# Patient Record
Sex: FEMALE | Race: WHITE | NOT HISPANIC OR LATINO
[De-identification: names, ages, dates, MRNs, and addresses within clinical notes are randomized per-mention and may not be internally consistent; named-entity substitution may affect disease eponyms.]

---

## 2017-07-17 ENCOUNTER — APPOINTMENT (OUTPATIENT)
Dept: SURGICAL ONCOLOGY | Facility: CLINIC | Age: 77
End: 2017-07-17
Payer: MEDICARE

## 2017-07-17 VITALS
HEART RATE: 82 BPM | HEIGHT: 65 IN | RESPIRATION RATE: 15 BRPM | DIASTOLIC BLOOD PRESSURE: 84 MMHG | WEIGHT: 183 LBS | SYSTOLIC BLOOD PRESSURE: 127 MMHG | BODY MASS INDEX: 30.49 KG/M2

## 2017-07-17 PROCEDURE — 99203 OFFICE O/P NEW LOW 30 MIN: CPT

## 2017-07-26 ENCOUNTER — OUTPATIENT (OUTPATIENT)
Dept: OUTPATIENT SERVICES | Facility: HOSPITAL | Age: 77
LOS: 1 days | End: 2017-07-26
Payer: MEDICARE

## 2017-07-26 DIAGNOSIS — L98.9 DISORDER OF THE SKIN AND SUBCUTANEOUS TISSUE, UNSPECIFIED: ICD-10-CM

## 2017-07-27 ENCOUNTER — RESULT REVIEW (OUTPATIENT)
Age: 77
End: 2017-07-27

## 2017-07-27 PROCEDURE — 88321 CONSLTJ&REPRT SLD PREP ELSWR: CPT

## 2017-07-28 LAB — SURGICAL PATHOLOGY STUDY: SIGNIFICANT CHANGE UP

## 2017-08-09 ENCOUNTER — APPOINTMENT (OUTPATIENT)
Dept: THORACIC SURGERY | Facility: CLINIC | Age: 77
End: 2017-08-09
Payer: MEDICARE

## 2017-08-09 ENCOUNTER — OUTPATIENT (OUTPATIENT)
Dept: OUTPATIENT SERVICES | Facility: HOSPITAL | Age: 77
LOS: 1 days | End: 2017-08-09
Payer: MEDICARE

## 2017-08-09 VITALS
SYSTOLIC BLOOD PRESSURE: 132 MMHG | TEMPERATURE: 98 F | HEIGHT: 64 IN | OXYGEN SATURATION: 96 % | RESPIRATION RATE: 16 BRPM | WEIGHT: 182.98 LBS | DIASTOLIC BLOOD PRESSURE: 77 MMHG | HEART RATE: 74 BPM

## 2017-08-09 VITALS
DIASTOLIC BLOOD PRESSURE: 76 MMHG | HEART RATE: 80 BPM | HEIGHT: 65 IN | RESPIRATION RATE: 16 BRPM | SYSTOLIC BLOOD PRESSURE: 134 MMHG | WEIGHT: 183 LBS | OXYGEN SATURATION: 94 % | BODY MASS INDEX: 30.49 KG/M2

## 2017-08-09 DIAGNOSIS — Z01.818 ENCOUNTER FOR OTHER PREPROCEDURAL EXAMINATION: ICD-10-CM

## 2017-08-09 DIAGNOSIS — D03.62 MELANOMA IN SITU OF LEFT UPPER LIMB, INCLUDING SHOULDER: ICD-10-CM

## 2017-08-09 DIAGNOSIS — Z98.890 OTHER SPECIFIED POSTPROCEDURAL STATES: Chronic | ICD-10-CM

## 2017-08-09 PROCEDURE — 99213 OFFICE O/P EST LOW 20 MIN: CPT

## 2017-08-09 PROCEDURE — G0463: CPT

## 2017-08-09 RX ORDER — LIDOCAINE HCL 20 MG/ML
0.2 VIAL (ML) INJECTION ONCE
Qty: 0 | Refills: 0 | Status: DISCONTINUED | OUTPATIENT
Start: 2017-08-18 | End: 2017-09-02

## 2017-08-09 RX ORDER — SODIUM CHLORIDE 9 MG/ML
3 INJECTION INTRAMUSCULAR; INTRAVENOUS; SUBCUTANEOUS EVERY 8 HOURS
Qty: 0 | Refills: 0 | Status: DISCONTINUED | OUTPATIENT
Start: 2017-08-18 | End: 2017-09-02

## 2017-08-09 NOTE — H&P PST ADULT - NSANTHOSAYNRD_GEN_A_CORE
No. ELISA screening performed.  STOP BANG Legend: 0-2 = LOW Risk; 3-4 = INTERMEDIATE Risk; 5-8 = HIGH Risk

## 2017-08-09 NOTE — H&P PST ADULT - HISTORY OF PRESENT ILLNESS
77 year old female with h/o 77 year old female with h/o HTN, melanoma- f/u dermatology evaluation revealed left shoulder lesion- + biopsy for wide excision melanoma left shoulder on 08/18/2017

## 2017-08-09 NOTE — H&P PST ADULT - ATTENDING COMMENTS
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    Technique and indication Were reviewed in my office in on the day of operation    All questions answered.    Consent on chart

## 2017-08-09 NOTE — H&P PST ADULT - NEGATIVE CARDIOVASCULAR SYMPTOMS
no palpitations/no dyspnea on exertion/no orthopnea/no chest pain/no peripheral edema/no claudication/no paroxysmal nocturnal dyspnea

## 2017-08-09 NOTE — H&P PST ADULT - PMH
C. difficile diarrhea    Diverticulosis of large intestine with hemorrhage  04/2017  Ex-smoker    HTN (hypertension)    Hx of melanoma of skin- leg  >7 years ago    Hyperlipidemia    Lung nodules- H/O    Melanoma in situ of left shoulder    SCC (squamous cell carcinoma)  anterior upper chest wall

## 2017-08-09 NOTE — H&P PST ADULT - PSH
H/O colonoscopy  06/08/2017  H/O melanoma excision  ?  H/O: hysterectomy- 1982    Hx of tonsillectomy  in childhood  Squamous cell skin cancer  excision and biopsy > 1 year  Status post lung surgery > 7 years ago  lung ca.

## 2017-08-18 ENCOUNTER — OUTPATIENT (OUTPATIENT)
Dept: OUTPATIENT SERVICES | Facility: HOSPITAL | Age: 77
LOS: 1 days | End: 2017-08-18
Payer: MEDICARE

## 2017-08-18 ENCOUNTER — RESULT REVIEW (OUTPATIENT)
Age: 77
End: 2017-08-18

## 2017-08-18 ENCOUNTER — APPOINTMENT (OUTPATIENT)
Dept: SURGICAL ONCOLOGY | Facility: HOSPITAL | Age: 77
End: 2017-08-18

## 2017-08-18 VITALS
RESPIRATION RATE: 18 BRPM | DIASTOLIC BLOOD PRESSURE: 60 MMHG | SYSTOLIC BLOOD PRESSURE: 109 MMHG | TEMPERATURE: 98 F | HEART RATE: 69 BPM | OXYGEN SATURATION: 97 %

## 2017-08-18 VITALS
HEIGHT: 64 IN | SYSTOLIC BLOOD PRESSURE: 124 MMHG | DIASTOLIC BLOOD PRESSURE: 81 MMHG | HEART RATE: 80 BPM | OXYGEN SATURATION: 95 % | TEMPERATURE: 98 F | WEIGHT: 182.98 LBS | RESPIRATION RATE: 16 BRPM

## 2017-08-18 DIAGNOSIS — Z01.818 ENCOUNTER FOR OTHER PREPROCEDURAL EXAMINATION: ICD-10-CM

## 2017-08-18 DIAGNOSIS — Z98.890 OTHER SPECIFIED POSTPROCEDURAL STATES: Chronic | ICD-10-CM

## 2017-08-18 DIAGNOSIS — D03.62 MELANOMA IN SITU OF LEFT UPPER LIMB, INCLUDING SHOULDER: ICD-10-CM

## 2017-08-18 PROCEDURE — 14001 TIS TRNFR TRUNK 10.1-30SQCM: CPT

## 2017-08-18 PROCEDURE — 88305 TISSUE EXAM BY PATHOLOGIST: CPT

## 2017-08-18 PROCEDURE — 11606 EXC TR-EXT MAL+MARG >4 CM: CPT | Mod: 59

## 2017-08-18 PROCEDURE — 88305 TISSUE EXAM BY PATHOLOGIST: CPT | Mod: 26

## 2017-08-18 RX ORDER — ACETAMINOPHEN 500 MG
975 TABLET ORAL ONCE
Qty: 0 | Refills: 0 | Status: COMPLETED | OUTPATIENT
Start: 2017-08-18 | End: 2017-08-18

## 2017-08-18 RX ORDER — CELECOXIB 200 MG/1
200 CAPSULE ORAL ONCE
Qty: 0 | Refills: 0 | Status: COMPLETED | OUTPATIENT
Start: 2017-08-18 | End: 2017-08-18

## 2017-08-18 RX ORDER — ONDANSETRON 8 MG/1
4 TABLET, FILM COATED ORAL ONCE
Qty: 0 | Refills: 0 | Status: DISCONTINUED | OUTPATIENT
Start: 2017-08-18 | End: 2017-09-02

## 2017-08-18 RX ORDER — SODIUM CHLORIDE 9 MG/ML
1000 INJECTION INTRAMUSCULAR; INTRAVENOUS; SUBCUTANEOUS
Qty: 0 | Refills: 0 | Status: DISCONTINUED | OUTPATIENT
Start: 2017-08-18 | End: 2017-09-02

## 2017-08-18 NOTE — PRE-ANESTHESIA EVALUATION ADULT - NSANTHPMHFT_GEN_ALL_CORE
CDIff diarrhea 3 years ago  Stopped smoking 2009 with lung cancer, s/p resection. seen lung doctor 2 weeks ago, everything ok  GERD only with food tomatoes, takes zantac PRN, last time took it was last night, feels good today

## 2017-08-18 NOTE — ASU DISCHARGE PLAN (ADULT/PEDIATRIC). - NOTIFY
Numbness, tingling/Increased Irritability or Sluggishness/Persistent Nausea and Vomiting/Bleeding that does not stop/Excessive Diarrhea/Fever greater than 101/Pain not relieved by Medications/Unable to Urinate/Numbness, color, or temperature change to extremity/Inability to Tolerate Liquids or Foods/Swelling that continues

## 2017-08-21 ENCOUNTER — TRANSCRIPTION ENCOUNTER (OUTPATIENT)
Age: 77
End: 2017-08-21

## 2017-08-22 LAB — SURGICAL PATHOLOGY STUDY: SIGNIFICANT CHANGE UP

## 2017-09-11 ENCOUNTER — APPOINTMENT (OUTPATIENT)
Dept: SURGICAL ONCOLOGY | Facility: CLINIC | Age: 77
End: 2017-09-11
Payer: MEDICARE

## 2017-09-11 VITALS
HEART RATE: 89 BPM | DIASTOLIC BLOOD PRESSURE: 82 MMHG | SYSTOLIC BLOOD PRESSURE: 137 MMHG | BODY MASS INDEX: 30.73 KG/M2 | TEMPERATURE: 98.3 F | HEIGHT: 64 IN | WEIGHT: 180 LBS | RESPIRATION RATE: 16 BRPM | OXYGEN SATURATION: 95 %

## 2017-09-11 DIAGNOSIS — Z87.59 PERSONAL HISTORY OF OTHER COMPLICATIONS OF PREGNANCY, CHILDBIRTH AND THE PUERPERIUM: ICD-10-CM

## 2017-09-11 DIAGNOSIS — Z87.19 PERSONAL HISTORY OF OTHER DISEASES OF THE DIGESTIVE SYSTEM: ICD-10-CM

## 2017-09-11 DIAGNOSIS — D03.62 MELANOMA IN SITU OF LEFT UPPER LIMB, INCLUDING SHOULDER: ICD-10-CM

## 2017-09-11 PROCEDURE — 99024 POSTOP FOLLOW-UP VISIT: CPT

## 2018-07-13 ENCOUNTER — RX RENEWAL (OUTPATIENT)
Age: 78
End: 2018-07-13

## 2018-09-22 ENCOUNTER — RECORD ABSTRACTING (OUTPATIENT)
Age: 78
End: 2018-09-22

## 2018-09-22 DIAGNOSIS — I35.9 NONRHEUMATIC AORTIC VALVE DISORDER, UNSPECIFIED: ICD-10-CM

## 2018-09-22 DIAGNOSIS — E66.3 OVERWEIGHT: ICD-10-CM

## 2018-09-22 DIAGNOSIS — K57.32 DIVERTICULITIS OF LARGE INTESTINE W/OUT PERFORATION OR ABSCESS W/OUT BLEEDING: ICD-10-CM

## 2018-09-22 DIAGNOSIS — Z87.828 PERSONAL HISTORY OF OTHER (HEALED) PHYSICAL INJURY AND TRAUMA: ICD-10-CM

## 2018-09-22 DIAGNOSIS — I73.9 PERIPHERAL VASCULAR DISEASE, UNSPECIFIED: ICD-10-CM

## 2018-09-22 RX ORDER — ASPIRIN 81 MG
81 TABLET, DELAYED RELEASE (ENTERIC COATED) ORAL
Refills: 0 | Status: ACTIVE | COMMUNITY

## 2018-11-28 ENCOUNTER — MEDICATION RENEWAL (OUTPATIENT)
Age: 78
End: 2018-11-28

## 2018-11-29 ENCOUNTER — MEDICATION RENEWAL (OUTPATIENT)
Age: 78
End: 2018-11-29

## 2018-12-07 ENCOUNTER — APPOINTMENT (OUTPATIENT)
Dept: PULMONOLOGY | Facility: CLINIC | Age: 78
End: 2018-12-07
Payer: MEDICARE

## 2018-12-07 VITALS
OXYGEN SATURATION: 92 % | SYSTOLIC BLOOD PRESSURE: 115 MMHG | DIASTOLIC BLOOD PRESSURE: 74 MMHG | HEART RATE: 86 BPM | RESPIRATION RATE: 16 BRPM

## 2018-12-07 PROBLEM — K57.31 DIVERTICULOSIS OF LARGE INTESTINE WITHOUT PERFORATION OR ABSCESS WITH BLEEDING: Chronic | Status: ACTIVE | Noted: 2017-08-09

## 2018-12-07 PROBLEM — D03.62 MELANOMA IN SITU OF LEFT UPPER LIMB, INCLUDING SHOULDER: Chronic | Status: ACTIVE | Noted: 2017-08-09

## 2018-12-07 PROCEDURE — 99214 OFFICE O/P EST MOD 30 MIN: CPT

## 2018-12-07 RX ORDER — LISINOPRIL 20 MG/1
20 TABLET ORAL
Refills: 0 | Status: ACTIVE | COMMUNITY

## 2018-12-07 RX ORDER — AMLODIPINE BESYLATE 5 MG/1
5 TABLET ORAL
Refills: 0 | Status: DISCONTINUED | COMMUNITY
End: 2018-12-07

## 2018-12-07 RX ORDER — ATORVASTATIN CALCIUM 40 MG/1
40 TABLET, FILM COATED ORAL
Refills: 0 | Status: DISCONTINUED | COMMUNITY
End: 2018-12-07

## 2018-12-07 RX ORDER — LISINOPRIL 10 MG/1
10 TABLET ORAL
Refills: 0 | Status: DISCONTINUED | COMMUNITY
End: 2018-12-07

## 2019-02-20 ENCOUNTER — APPOINTMENT (OUTPATIENT)
Dept: THORACIC SURGERY | Facility: CLINIC | Age: 79
End: 2019-02-20
Payer: MEDICARE

## 2019-02-20 VITALS
DIASTOLIC BLOOD PRESSURE: 84 MMHG | TEMPERATURE: 97.9 F | BODY MASS INDEX: 33.97 KG/M2 | HEIGHT: 64 IN | OXYGEN SATURATION: 95 % | WEIGHT: 199 LBS | SYSTOLIC BLOOD PRESSURE: 125 MMHG | RESPIRATION RATE: 16 BRPM | HEART RATE: 80 BPM

## 2019-02-20 DIAGNOSIS — Z86.19 PERSONAL HISTORY OF OTHER INFECTIOUS AND PARASITIC DISEASES: ICD-10-CM

## 2019-02-20 PROCEDURE — 99213 OFFICE O/P EST LOW 20 MIN: CPT

## 2019-02-21 PROBLEM — Z86.19 HISTORY OF HERPES ZOSTER: Status: RESOLVED | Noted: 2019-02-21 | Resolved: 2019-02-21

## 2019-02-21 RX ORDER — ATORVASTATIN CALCIUM 40 MG/1
40 TABLET, FILM COATED ORAL
Refills: 0 | Status: ACTIVE | COMMUNITY

## 2019-02-21 RX ORDER — ATORVASTATIN CALCIUM 80 MG/1
80 TABLET, FILM COATED ORAL
Refills: 0 | Status: DISCONTINUED | COMMUNITY
End: 2019-02-21

## 2019-02-21 NOTE — PHYSICAL EXAM
[Sclera] : the sclera and conjunctiva were normal [Extraocular Movements] : extraocular movements were intact [Neck Appearance] : the appearance of the neck was normal [Neck Cervical Mass (___cm)] : no neck mass was observed [Jugular Venous Distention Increased] : there was no jugular-venous distention [] : no respiratory distress [Respiration, Rhythm And Depth] : normal respiratory rhythm and effort [Heart Rate And Rhythm] : heart rate was normal and rhythm regular [Chest Visual Inspection Thoracic Asymmetry] : no chest asymmetry [Diminished Respiratory Excursion] : normal chest expansion [2+] : left 2+ [Bowel Sounds] : normal bowel sounds [Abdomen Soft] : soft [Abdomen Tenderness] : non-tender [Cervical Lymph Nodes Enlarged Posterior Bilaterally] : posterior cervical [Cervical Lymph Nodes Enlarged Anterior Bilaterally] : anterior cervical [Supraclavicular Lymph Nodes Enlarged Bilaterally] : supraclavicular [Abnormal Walk] : normal gait [Skin Color & Pigmentation] : normal skin color and pigmentation [No Focal Deficits] : no focal deficits [Oriented To Time, Place, And Person] : oriented to person, place, and time [Impaired Insight] : insight and judgment were intact [Affect] : the affect was normal [Mood] : the mood was normal

## 2019-02-22 NOTE — CONSULT LETTER
[Dear  ___] : Dear  [unfilled], [Courtesy Letter:] : I had the pleasure of seeing your patient, [unfilled], in my office today. [Please see my note below.] : Please see my note below. [Sincerely,] : Sincerely, [FreeTextEntry2] :  Dr. Carlos Hannah \par  [FreeTextEntry3] : \par Wilmar Rios MD, FACS \par , Division of Thoracic Surgery \par [facility] \par Chief, Thoracic Surgery \par [facility] \par Department of Cardiovascular & Thoracic Surgery \par  \par [employer] [facility] [employer] [facility] \par

## 2019-02-22 NOTE — HISTORY OF PRESENT ILLNESS
[FreeTextEntry1] : 78 year old female with history of a right VATS, right lower lobe wedge resection on 2/2/09 for a T1NxMx adenocarcinoma with JUAN M features. She also has hx of Moh's surgery on right leg for a squamous cell and resection of melanoma on left shoulder with Dr. Dias. She presents today for 18 month follow up. \par \par CT chest scan on 2/15/19 reveals scattered nodular opacities that are stable. There is a 30 x 23 mm nodule of left adrenal that has increased in both size and density since previous exam. \par \par CT scan of the chest done 8/4/17 reports stable post surgical changes in right lower lobe. Patchy low density nodularity in the left lower lobe is also unchanged, and no new pulmonary lesions. \par \par The patient reports shortness of breath with exertion ex: climbing stairs. She is followed by Dr. Hannah. The patient denies cough, chest pain, fever, chills, dysphagia or hemoptysis. \par

## 2019-02-22 NOTE — ASSESSMENT
[FreeTextEntry1] : 78 year old female with history of a right VATS, right lower lobe wedge resection on 2/2/09 for a T1NxMx adenocarcinoma with JUAN M features. CT chest scan on 2/15/19 reveals scattered nodular opacities which are stable. There is a 30 x 23 mm nodule of left adrenal that has increased in both size and density since previous exam. I have reviewed the patient's images from previous years. The adrenal nodule appears stable and benign. I have recommended that the patient follow up in 18 months with a CT scan of the chest. \par \par Written by Nadja Goncalves NP, acting as a scribe for Wilmar Valentine MD.\par The documentation recorded by the scribe accurately reflects the service I personally performed and the decisions made by me. WILMAR VALENTINE MD\par

## 2019-02-22 NOTE — REVIEW OF SYSTEMS
[As Noted in HPI] : as noted in HPI [Negative] : Heme/Lymph [FreeTextEntry9] : Left hip pain. Plan for injections with pain management.

## 2019-03-05 ENCOUNTER — OUTPATIENT (OUTPATIENT)
Dept: OUTPATIENT SERVICES | Facility: HOSPITAL | Age: 79
LOS: 1 days | End: 2019-03-05
Payer: MEDICARE

## 2019-03-05 DIAGNOSIS — Z98.890 OTHER SPECIFIED POSTPROCEDURAL STATES: Chronic | ICD-10-CM

## 2019-03-05 DIAGNOSIS — M47.817 SPONDYLOSIS WITHOUT MYELOPATHY OR RADICULOPATHY, LUMBOSACRAL REGION: ICD-10-CM

## 2019-03-05 PROCEDURE — 64494 INJ PARAVERT F JNT L/S 2 LEV: CPT | Mod: LT

## 2019-03-05 PROCEDURE — 77003 FLUOROGUIDE FOR SPINE INJECT: CPT

## 2019-03-05 PROCEDURE — 64495 INJ PARAVERT F JNT L/S 3 LEV: CPT | Mod: LT

## 2019-03-05 PROCEDURE — 64493 INJ PARAVERT F JNT L/S 1 LEV: CPT | Mod: LT

## 2019-03-19 ENCOUNTER — OUTPATIENT (OUTPATIENT)
Dept: OUTPATIENT SERVICES | Facility: HOSPITAL | Age: 79
LOS: 1 days | End: 2019-03-19
Payer: MEDICARE

## 2019-03-19 DIAGNOSIS — Z98.890 OTHER SPECIFIED POSTPROCEDURAL STATES: Chronic | ICD-10-CM

## 2019-03-19 DIAGNOSIS — M47.817 SPONDYLOSIS WITHOUT MYELOPATHY OR RADICULOPATHY, LUMBOSACRAL REGION: ICD-10-CM

## 2019-03-19 PROCEDURE — 64494 INJ PARAVERT F JNT L/S 2 LEV: CPT | Mod: LT

## 2019-03-19 PROCEDURE — 64495 INJ PARAVERT F JNT L/S 3 LEV: CPT | Mod: LT

## 2019-03-19 PROCEDURE — 64493 INJ PARAVERT F JNT L/S 1 LEV: CPT | Mod: LT

## 2019-03-19 PROCEDURE — 77003 FLUOROGUIDE FOR SPINE INJECT: CPT

## 2019-06-07 ENCOUNTER — APPOINTMENT (OUTPATIENT)
Dept: PULMONOLOGY | Facility: CLINIC | Age: 79
End: 2019-06-07
Payer: MEDICARE

## 2019-06-07 DIAGNOSIS — C43.9 MALIGNANT MELANOMA OF SKIN, UNSPECIFIED: ICD-10-CM

## 2019-06-07 PROCEDURE — 99214 OFFICE O/P EST MOD 30 MIN: CPT

## 2019-06-09 NOTE — DISCUSSION/SUMMARY
[FreeTextEntry1] : Patient with multiple medical problems. All medical problems as well his medications were reviewed. Medications were renewed.\par \par There is no significant need for change in present medication or therapy.\par \par overweight

## 2019-06-09 NOTE — HISTORY OF PRESENT ILLNESS
[FreeTextEntry1] : Doing OK\par \par Problem with hip and back on left. \par Seeing Dr. Buitrago\par \par Colonoscopy 2017\par Mammo Y\par GYN Y\par Optho Y\par Derm Y\par BMD 6/18\par \par \par Has gained wt. \par

## 2019-06-09 NOTE — PHYSICAL EXAM
[General Appearance - Well Developed] : well developed [Jugular Venous Distention Increased] : there was no jugular-venous distention [Normal Oropharynx] : normal oropharynx [Abdomen Soft] : soft [Heart Sounds] : normal S1 and S2 [Nail Clubbing] : no clubbing of the fingernails [Cyanosis, Localized] : no localized cyanosis [Skin Color & Pigmentation] : normal skin color and pigmentation [] : no ischemic changes [Petechial Hemorrhages (___cm)] : no petechial hemorrhages [No Venous Stasis] : no venous stasis [Skin Lesions] : no skin lesions [No Skin Ulcers] : no skin ulcer [Deep Tendon Reflexes (DTR)] : deep tendon reflexes were 2+ and symmetric [No Xanthoma] : no  xanthoma was observed [Oriented To Time, Place, And Person] : oriented to person, place, and time [No Focal Deficits] : no focal deficits [Sensation] : the sensory exam was normal to light touch and pinprick [Impaired Insight] : insight and judgment were intact [Affect] : the affect was normal [FreeTextEntry1] : Overweight

## 2019-06-17 ENCOUNTER — OUTPATIENT (OUTPATIENT)
Dept: OUTPATIENT SERVICES | Facility: HOSPITAL | Age: 79
LOS: 1 days | End: 2019-06-17
Payer: MEDICARE

## 2019-06-17 DIAGNOSIS — Z98.890 OTHER SPECIFIED POSTPROCEDURAL STATES: Chronic | ICD-10-CM

## 2019-06-17 DIAGNOSIS — M47.817 SPONDYLOSIS WITHOUT MYELOPATHY OR RADICULOPATHY, LUMBOSACRAL REGION: ICD-10-CM

## 2019-06-17 PROCEDURE — 64635 DESTROY LUMB/SAC FACET JNT: CPT | Mod: LT

## 2019-06-17 PROCEDURE — 77003 FLUOROGUIDE FOR SPINE INJECT: CPT

## 2019-06-17 PROCEDURE — 64636 DESTROY L/S FACET JNT ADDL: CPT | Mod: LT

## 2019-11-25 ENCOUNTER — APPOINTMENT (OUTPATIENT)
Dept: ORTHOPEDIC SURGERY | Facility: CLINIC | Age: 79
End: 2019-11-25
Payer: MEDICARE

## 2019-11-25 VITALS
HEART RATE: 83 BPM | SYSTOLIC BLOOD PRESSURE: 162 MMHG | BODY MASS INDEX: 31.58 KG/M2 | HEIGHT: 64 IN | WEIGHT: 185 LBS | DIASTOLIC BLOOD PRESSURE: 91 MMHG

## 2019-11-25 DIAGNOSIS — Z60.2 PROBLEMS RELATED TO LIVING ALONE: ICD-10-CM

## 2019-11-25 PROCEDURE — 72110 X-RAY EXAM L-2 SPINE 4/>VWS: CPT

## 2019-11-25 PROCEDURE — 99204 OFFICE O/P NEW MOD 45 MIN: CPT

## 2019-11-25 RX ORDER — AMLODIPINE BESYLATE 2.5 MG/1
2.5 TABLET ORAL DAILY
Qty: 90 | Refills: 3 | Status: DISCONTINUED | COMMUNITY
Start: 2018-12-07 | End: 2019-11-25

## 2019-11-25 SDOH — SOCIAL STABILITY - SOCIAL INSECURITY: PROBLEMS RELATED TO LIVING ALONE: Z60.2

## 2019-11-26 ENCOUNTER — CLINICAL ADVICE (OUTPATIENT)
Age: 79
End: 2019-11-26

## 2019-11-26 ENCOUNTER — OUTPATIENT (OUTPATIENT)
Dept: OUTPATIENT SERVICES | Facility: HOSPITAL | Age: 79
LOS: 1 days | End: 2019-11-26
Payer: MEDICARE

## 2019-11-26 DIAGNOSIS — Z98.890 OTHER SPECIFIED POSTPROCEDURAL STATES: Chronic | ICD-10-CM

## 2019-11-26 DIAGNOSIS — M54.16 RADICULOPATHY, LUMBAR REGION: ICD-10-CM

## 2019-11-26 PROCEDURE — 77003 FLUOROGUIDE FOR SPINE INJECT: CPT

## 2019-11-26 PROCEDURE — 64484 NJX AA&/STRD TFRM EPI L/S EA: CPT | Mod: LT

## 2019-11-26 PROCEDURE — 64483 NJX AA&/STRD TFRM EPI L/S 1: CPT | Mod: LT

## 2019-12-01 ENCOUNTER — FORM ENCOUNTER (OUTPATIENT)
Age: 79
End: 2019-12-01

## 2019-12-02 ENCOUNTER — APPOINTMENT (OUTPATIENT)
Dept: CT IMAGING | Facility: CLINIC | Age: 79
End: 2019-12-02
Payer: MEDICARE

## 2019-12-02 ENCOUNTER — OUTPATIENT (OUTPATIENT)
Dept: OUTPATIENT SERVICES | Facility: HOSPITAL | Age: 79
LOS: 1 days | End: 2019-12-02
Payer: MEDICARE

## 2019-12-02 DIAGNOSIS — Z98.890 OTHER SPECIFIED POSTPROCEDURAL STATES: Chronic | ICD-10-CM

## 2019-12-02 DIAGNOSIS — M48.061 SPINAL STENOSIS, LUMBAR REGION WITHOUT NEUROGENIC CLAUDICATION: ICD-10-CM

## 2019-12-02 DIAGNOSIS — M43.16 SPONDYLOLISTHESIS, LUMBAR REGION: ICD-10-CM

## 2019-12-02 DIAGNOSIS — M54.16 RADICULOPATHY, LUMBAR REGION: ICD-10-CM

## 2019-12-02 PROCEDURE — 72131 CT LUMBAR SPINE W/O DYE: CPT | Mod: 26

## 2019-12-02 PROCEDURE — 72131 CT LUMBAR SPINE W/O DYE: CPT

## 2019-12-02 PROCEDURE — 76376 3D RENDER W/INTRP POSTPROCES: CPT | Mod: 26

## 2019-12-02 PROCEDURE — 76376 3D RENDER W/INTRP POSTPROCES: CPT

## 2019-12-05 ENCOUNTER — FORM ENCOUNTER (OUTPATIENT)
Age: 79
End: 2019-12-05

## 2019-12-06 ENCOUNTER — LABORATORY RESULT (OUTPATIENT)
Age: 79
End: 2019-12-06

## 2019-12-06 ENCOUNTER — APPOINTMENT (OUTPATIENT)
Dept: PULMONOLOGY | Facility: CLINIC | Age: 79
End: 2019-12-06
Payer: MEDICARE

## 2019-12-06 ENCOUNTER — OUTPATIENT (OUTPATIENT)
Dept: OUTPATIENT SERVICES | Facility: HOSPITAL | Age: 79
LOS: 1 days | End: 2019-12-06
Payer: MEDICARE

## 2019-12-06 VITALS
OXYGEN SATURATION: 95 % | SYSTOLIC BLOOD PRESSURE: 132 MMHG | HEART RATE: 84 BPM | RESPIRATION RATE: 16 BRPM | DIASTOLIC BLOOD PRESSURE: 70 MMHG | HEIGHT: 64 IN | BODY MASS INDEX: 31.58 KG/M2 | WEIGHT: 185 LBS

## 2019-12-06 VITALS
HEIGHT: 64 IN | WEIGHT: 190.92 LBS | DIASTOLIC BLOOD PRESSURE: 83 MMHG | TEMPERATURE: 98 F | OXYGEN SATURATION: 95 % | HEART RATE: 103 BPM | SYSTOLIC BLOOD PRESSURE: 128 MMHG

## 2019-12-06 DIAGNOSIS — M48.061 SPINAL STENOSIS, LUMBAR REGION WITHOUT NEUROGENIC CLAUDICATION: ICD-10-CM

## 2019-12-06 DIAGNOSIS — Z01.818 ENCOUNTER FOR OTHER PREPROCEDURAL EXAMINATION: ICD-10-CM

## 2019-12-06 DIAGNOSIS — M43.10 SPONDYLOLISTHESIS, SITE UNSPECIFIED: ICD-10-CM

## 2019-12-06 DIAGNOSIS — M54.16 RADICULOPATHY, LUMBAR REGION: ICD-10-CM

## 2019-12-06 DIAGNOSIS — Z98.890 OTHER SPECIFIED POSTPROCEDURAL STATES: Chronic | ICD-10-CM

## 2019-12-06 DIAGNOSIS — M43.16 SPONDYLOLISTHESIS, LUMBAR REGION: ICD-10-CM

## 2019-12-06 DIAGNOSIS — E78.5 HYPERLIPIDEMIA, UNSPECIFIED: ICD-10-CM

## 2019-12-06 DIAGNOSIS — I10 ESSENTIAL (PRIMARY) HYPERTENSION: ICD-10-CM

## 2019-12-06 DIAGNOSIS — R76.0 RAISED ANTIBODY TITER: ICD-10-CM

## 2019-12-06 LAB
ANION GAP SERPL CALC-SCNC: 12 MMOL/L — SIGNIFICANT CHANGE UP (ref 5–17)
BLD GP AB SCN SERPL QL: NEGATIVE — SIGNIFICANT CHANGE UP
BUN SERPL-MCNC: 22 MG/DL — SIGNIFICANT CHANGE UP (ref 7–23)
CALCIUM SERPL-MCNC: 10 MG/DL — SIGNIFICANT CHANGE UP (ref 8.4–10.5)
CHLORIDE SERPL-SCNC: 107 MMOL/L — SIGNIFICANT CHANGE UP (ref 96–108)
CO2 SERPL-SCNC: 24 MMOL/L — SIGNIFICANT CHANGE UP (ref 22–31)
CREAT SERPL-MCNC: 0.66 MG/DL — SIGNIFICANT CHANGE UP (ref 0.5–1.3)
GLUCOSE SERPL-MCNC: 105 MG/DL — HIGH (ref 70–99)
HCT VFR BLD CALC: 41.3 % — SIGNIFICANT CHANGE UP (ref 34.5–45)
HGB BLD-MCNC: 13 G/DL — SIGNIFICANT CHANGE UP (ref 11.5–15.5)
MCHC RBC-ENTMCNC: 30.9 PG — SIGNIFICANT CHANGE UP (ref 27–34)
MCHC RBC-ENTMCNC: 31.5 GM/DL — LOW (ref 32–36)
MCV RBC AUTO: 98.1 FL — SIGNIFICANT CHANGE UP (ref 80–100)
PLATELET # BLD AUTO: 230 K/UL — SIGNIFICANT CHANGE UP (ref 150–400)
POCT - HEMOGLOBIN (HGB), QUANTITATIVE, TRANSCUTANEOUS: 13.5
POTASSIUM SERPL-MCNC: 3.7 MMOL/L — SIGNIFICANT CHANGE UP (ref 3.5–5.3)
POTASSIUM SERPL-SCNC: 3.7 MMOL/L — SIGNIFICANT CHANGE UP (ref 3.5–5.3)
RBC # BLD: 4.21 M/UL — SIGNIFICANT CHANGE UP (ref 3.8–5.2)
RBC # FLD: 13.7 % — SIGNIFICANT CHANGE UP (ref 10.3–14.5)
RH IG SCN BLD-IMP: POSITIVE — SIGNIFICANT CHANGE UP
SODIUM SERPL-SCNC: 143 MMOL/L — SIGNIFICANT CHANGE UP (ref 135–145)
WBC # BLD: 5.81 K/UL — SIGNIFICANT CHANGE UP (ref 3.8–10.5)
WBC # FLD AUTO: 5.81 K/UL — SIGNIFICANT CHANGE UP (ref 3.8–10.5)

## 2019-12-06 PROCEDURE — 86901 BLOOD TYPING SEROLOGIC RH(D): CPT

## 2019-12-06 PROCEDURE — 99214 OFFICE O/P EST MOD 30 MIN: CPT | Mod: 25

## 2019-12-06 PROCEDURE — 94729 DIFFUSING CAPACITY: CPT

## 2019-12-06 PROCEDURE — 85027 COMPLETE CBC AUTOMATED: CPT

## 2019-12-06 PROCEDURE — 87640 STAPH A DNA AMP PROBE: CPT

## 2019-12-06 PROCEDURE — 87641 MR-STAPH DNA AMP PROBE: CPT

## 2019-12-06 PROCEDURE — 86850 RBC ANTIBODY SCREEN: CPT

## 2019-12-06 PROCEDURE — 94060 EVALUATION OF WHEEZING: CPT

## 2019-12-06 PROCEDURE — 88738 HGB QUANT TRANSCUTANEOUS: CPT

## 2019-12-06 PROCEDURE — 86900 BLOOD TYPING SEROLOGIC ABO: CPT

## 2019-12-06 PROCEDURE — G0463: CPT

## 2019-12-06 PROCEDURE — 36415 COLL VENOUS BLD VENIPUNCTURE: CPT

## 2019-12-06 PROCEDURE — 94727 GAS DIL/WSHOT DETER LNG VOL: CPT

## 2019-12-06 PROCEDURE — 80048 BASIC METABOLIC PNL TOTAL CA: CPT

## 2019-12-06 RX ORDER — FAMOTIDINE 10 MG/ML
0 INJECTION INTRAVENOUS
Qty: 0 | Refills: 0 | DISCHARGE

## 2019-12-06 RX ORDER — AMLODIPINE BESYLATE 2.5 MG/1
1 TABLET ORAL
Qty: 0 | Refills: 0 | DISCHARGE

## 2019-12-06 RX ORDER — ATORVASTATIN CALCIUM 80 MG/1
1 TABLET, FILM COATED ORAL
Qty: 0 | Refills: 0 | DISCHARGE

## 2019-12-06 RX ORDER — LISINOPRIL 2.5 MG/1
1 TABLET ORAL
Qty: 0 | Refills: 0 | DISCHARGE

## 2019-12-06 RX ORDER — CEFAZOLIN SODIUM 1 G
2000 VIAL (EA) INJECTION ONCE
Refills: 0 | Status: COMPLETED | OUTPATIENT
Start: 2019-12-17 | End: 2019-12-17

## 2019-12-06 NOTE — H&P PST ADULT - ASSESSMENT
CAPRINI SCORE [CLOT]    AGE RELATED RISK FACTORS                                                       MOBILITY RELATED FACTORS  [ ] Age 41-60 years                                            (1 Point)                  [ ] Bed rest                                                        (1 Point)  [ ] Age: 61-74 years                                           (2 Points)                 [ ] Plaster cast                                                   (2 Points)  [ ] Age= 75 years                                              (3 Points)                 [ ] Bed bound for more than 72 hours                 (2 Points)    DISEASE RELATED RISK FACTORS                                               GENDER SPECIFIC FACTORS  [ ] Edema in the lower extremities                       (1 Point)                  [ ] Pregnancy                                                     (1 Point)  [ ] Varicose veins                                               (1 Point)                  [ ] Post-partum < 6 weeks                                   (1 Point)             [ ] BMI > 25 Kg/m2                                            (1 Point)                  [ ] Hormonal therapy  or oral contraception          (1 Point)                 [ ] Sepsis (in the previous month)                        (1 Point)                  [ ] History of pregnancy complications                 (1 point)  [ ] Pneumonia or serious lung disease                                               [ ] Unexplained or recurrent                     (1 Point)           (in the previous month)                               (1 Point)  [ ] Abnormal pulmonary function test                     (1 Point)                 SURGERY RELATED RISK FACTORS  [ ] Acute myocardial infarction                              (1 Point)                 [ ]  Section                                             (1 Point)  [ ] Congestive heart failure (in the previous month)  (1 Point)               [ ] Minor surgery                                                  (1 Point)   [ ] Inflammatory bowel disease                             (1 Point)                 [ ] Arthroscopic surgery                                        (2 Points)  [ ] Central venous access                                      (2 Points)                [ ] General surgery lasting more than 45 minutes   (2 Points)       [ ] Stroke (in the previous month)                          (5 Points)               [ ] Elective arthroplasty                                         (5 Points)                                                                                                                                               HEMATOLOGY RELATED FACTORS                                                 TRAUMA RELATED RISK FACTORS  [ ] Prior episodes of VTE                                     (3 Points)                [ ] Fracture of the hip, pelvis, or leg                       (5 Points)  [ ] Positive family history for VTE                         (3 Points)                 [ ] Acute spinal cord injury (in the previous month)  (5 Points)  [ ] Prothrombin 08909 A                                     (3 Points)                 [ ] Paralysis  (less than 1 month)                             (5 Points)  [ ] Factor V Leiden                                             (3 Points)                  [ ] Multiple Trauma within 1 month                        (5 Points)  [ ] Lupus anticoagulants                                     (3 Points)                                                           [ ] Anticardiolipin antibodies                               (3 Points)                                                       [ ] High homocysteine in the blood                      (3 Points)                                             [ ] Other congenital or acquired thrombophilia      (3 Points)                                                [ ] Heparin induced thrombocytopenia                  (3 Points)                                          Total Score [     5     ]    Caprini Score 0 - 2:  Low Risk, No VTE Prophylaxis required for most patients, encourage ambulation  Caprini Score 3 - 6:  At Risk, pharmacologic VTE prophylaxis is indicated for most patients (in the absence of a contraindication)  Caprini Score Greater than or = 7:  High Risk, pharmacologic VTE prophylaxis is indicated for most patients (in the absence of a contraindication)

## 2019-12-06 NOTE — H&P PST ADULT - NSICDXPASTMEDICALHX_GEN_ALL_CORE_FT
PAST MEDICAL HISTORY:  C. difficile diarrhea     Diverticulosis of large intestine with hemorrhage 04/2017    Ex-smoker     HTN (hypertension)     Hx of melanoma of skin- leg  >7 years ago     Hyperlipidemia     Lung nodules- H/O     Melanoma in situ of left shoulder     SCC (squamous cell carcinoma) anterior upper chest wall

## 2019-12-06 NOTE — H&P PST ADULT - ACTIVITY
Pefrorms all ADLs however with difficulty due to left sided back pain Performs all ADLs however with difficulty due to left sided back pain

## 2019-12-06 NOTE — H&P PST ADULT - HISTORY OF PRESENT ILLNESS
Patient is a 79 year old female with past medical history of HTN, HLD, malignant meloma of skin (left shoulder & right calf)  and lung CA with Right lobe resection x 10 years.     Reports worsening left sided back pain that radiates down leg.    Scheduled L2- L5 lumbar laminectomy with posterior spinal instrumentation and fusion with Dr. Rob on  12/17/19 Patient is a 79 year old female with past medical history of HTN, HLD, malignant melanoma of skin 1985 and 2015 (left shoulder & right calf)  and lung CA with Right lobe resection x 10 years. Reports worsening left sided back pain that radiates down leg. Scheduled L2- L5 lumbar laminectomy with posterior spinal instrumentation and fusion with Dr. Rob on  12/17/19.  Of note- patient recently had upper back skin biospy performed by Dermatologist due h/o melanoma Patient is a 79 year old female with past medical history of HTN, HLD, malignant melanoma of skin (right calf) 1985, with excision of left shoulder in 2017,  and former smoker,  lung CA with Right lobe resection x 10 years. Reports worsening left sided back pain that radiates down leg. Scheduled L2- L5 lumbar laminectomy with posterior spinal instrumentation and fusion with Dr. Rob on  12/17/19.  Of note- patient recently had upper back skin biospy performed by Dermatologist due h/o melanoma

## 2019-12-06 NOTE — H&P PST ADULT - NSICDXPASTSURGICALHX_GEN_ALL_CORE_FT
PAST SURGICAL HISTORY:  H/O colonoscopy 06/08/2017    H/O melanoma excision ?    H/O: hysterectomy- 1982     Hx of tonsillectomy in childhood    Squamous cell skin cancer excision and biopsy > 1 year    Status post lung surgery > 7 years ago  lung ca.

## 2019-12-06 NOTE — H&P PST ADULT - NSICDXPROBLEM_GEN_ALL_CORE_FT
PROBLEM DIAGNOSES  Problem: Spondylisthesis  Assessment and Plan: Scheduled for L2- L5 Lumbar lamiectomy with posterior spinal fusion. Pt instructed to have last dose of 81mg Aspirin on 12/9/19 PROBLEM DIAGNOSES  Problem: Need for prophylactic measure  Assessment and Plan: The Caprini score indicates this patient is at a risk for a VTE event (score 3-5). Most surgical patients in this group would benefit from pharmacolgic prophylaxis. The surgical team will determine VTE risk and bleeding risk    Problem: Spondylisthesis  Assessment and Plan: Scheduled for L2- L5 Lumbar lamiectomy with posterior spinal fusion. Pt instructed to have last dose of 81mg Aspirin on 12/9/19

## 2019-12-07 LAB
MRSA PCR RESULT.: SIGNIFICANT CHANGE UP
S AUREUS DNA NOSE QL NAA+PROBE: SIGNIFICANT CHANGE UP

## 2019-12-11 NOTE — DISCUSSION/SUMMARY
[FreeTextEntry1] : Anticardiolipin antibody positive but prior Heme evaluation reports no evidence of hypercoagulable state. No history of clot. \par HTN\par Hyperlipidemia\par Overweight

## 2019-12-11 NOTE — HISTORY OF PRESENT ILLNESS
[FreeTextEntry1] : For lumbar fusion and laminectomy 12/17/19.\par \par Feeling otherwise well. \par \par Going for presurgical testing today.\par

## 2019-12-11 NOTE — PHYSICAL EXAM
[General Appearance - Well Developed] : well developed [Normal Oropharynx] : normal oropharynx [Heart Sounds] : normal S1 and S2 [Jugular Venous Distention Increased] : there was no jugular-venous distention [Abdomen Soft] : soft [Cyanosis, Localized] : no localized cyanosis [Petechial Hemorrhages (___cm)] : no petechial hemorrhages [Nail Clubbing] : no clubbing of the fingernails [Skin Color & Pigmentation] : normal skin color and pigmentation [] : no rash [No Venous Stasis] : no venous stasis [Skin Lesions] : no skin lesions [Deep Tendon Reflexes (DTR)] : deep tendon reflexes were 2+ and symmetric [No Xanthoma] : no  xanthoma was observed [No Skin Ulcers] : no skin ulcer [No Focal Deficits] : no focal deficits [Sensation] : the sensory exam was normal to light touch and pinprick [Oriented To Time, Place, And Person] : oriented to person, place, and time [Impaired Insight] : insight and judgment were intact [Affect] : the affect was normal [FreeTextEntry1] : Overweight

## 2019-12-11 NOTE — PROCEDURE
[FreeTextEntry1] : 12/06/2019\par Pulmonary function testing\par These data demonstrate a mild obstructive ventilatory deficit. There is no significant bronchodilator response. Normal Lung Volumes. There is a mild diffusion impairment. Corrects to normal with lung volume correction \par \par Labs noted.

## 2019-12-11 NOTE — ASSESSMENT
[FreeTextEntry1] : Cardiology clearance Dr. Sonido Vincent MD\par \par Medical problems as above. Medical status maximized. No medical contraindications to surgery.\par \par Discussed with hematology. Recc. DVT prophylaxis and start Eliquis 2.5 BID post-op when safe from surgical standpoint. Continue Eliquis until ambulating well.

## 2019-12-12 ENCOUNTER — CLINICAL ADVICE (OUTPATIENT)
Age: 79
End: 2019-12-12

## 2019-12-16 ENCOUNTER — CHART COPY (OUTPATIENT)
Age: 79
End: 2019-12-16

## 2019-12-16 ENCOUNTER — TRANSCRIPTION ENCOUNTER (OUTPATIENT)
Age: 79
End: 2019-12-16

## 2019-12-16 LAB — CARDIOLIPIN AB SER IA-ACNC: POSITIVE

## 2019-12-17 ENCOUNTER — APPOINTMENT (OUTPATIENT)
Dept: ORTHOPEDIC SURGERY | Facility: HOSPITAL | Age: 79
End: 2019-12-17

## 2019-12-17 ENCOUNTER — INPATIENT (INPATIENT)
Facility: HOSPITAL | Age: 79
LOS: 5 days | Discharge: HOME | DRG: 460 | End: 2019-12-23
Attending: ORTHOPAEDIC SURGERY | Admitting: ORTHOPAEDIC SURGERY
Payer: MEDICARE

## 2019-12-17 ENCOUNTER — RESULT REVIEW (OUTPATIENT)
Age: 79
End: 2019-12-17

## 2019-12-17 VITALS
HEART RATE: 86 BPM | OXYGEN SATURATION: 95 % | DIASTOLIC BLOOD PRESSURE: 84 MMHG | SYSTOLIC BLOOD PRESSURE: 146 MMHG | TEMPERATURE: 98 F | RESPIRATION RATE: 18 BRPM | WEIGHT: 190.04 LBS | HEIGHT: 64 IN

## 2019-12-17 DIAGNOSIS — M48.061 SPINAL STENOSIS, LUMBAR REGION WITHOUT NEUROGENIC CLAUDICATION: ICD-10-CM

## 2019-12-17 DIAGNOSIS — Z29.9 ENCOUNTER FOR PROPHYLACTIC MEASURES, UNSPECIFIED: ICD-10-CM

## 2019-12-17 DIAGNOSIS — M43.16 SPONDYLOLISTHESIS, LUMBAR REGION: ICD-10-CM

## 2019-12-17 DIAGNOSIS — Z98.890 OTHER SPECIFIED POSTPROCEDURAL STATES: Chronic | ICD-10-CM

## 2019-12-17 LAB
ANION GAP SERPL CALC-SCNC: 14 MMOL/L — SIGNIFICANT CHANGE UP (ref 5–17)
BASOPHILS # BLD AUTO: 0.01 K/UL — SIGNIFICANT CHANGE UP (ref 0–0.2)
BASOPHILS NFR BLD AUTO: 0.1 % — SIGNIFICANT CHANGE UP (ref 0–2)
BUN SERPL-MCNC: 13 MG/DL — SIGNIFICANT CHANGE UP (ref 7–23)
CALCIUM SERPL-MCNC: 8.2 MG/DL — LOW (ref 8.4–10.5)
CHLORIDE SERPL-SCNC: 111 MMOL/L — HIGH (ref 96–108)
CO2 SERPL-SCNC: 22 MMOL/L — SIGNIFICANT CHANGE UP (ref 22–31)
CREAT SERPL-MCNC: 0.56 MG/DL — SIGNIFICANT CHANGE UP (ref 0.5–1.3)
EOSINOPHIL # BLD AUTO: 0 K/UL — SIGNIFICANT CHANGE UP (ref 0–0.5)
EOSINOPHIL NFR BLD AUTO: 0 % — SIGNIFICANT CHANGE UP (ref 0–6)
GAS PNL BLDA: SIGNIFICANT CHANGE UP
GAS PNL BLDA: SIGNIFICANT CHANGE UP
GLUCOSE SERPL-MCNC: 165 MG/DL — HIGH (ref 70–99)
HCT VFR BLD CALC: 26.9 % — LOW (ref 34.5–45)
HCT VFR BLD CALC: 29.9 % — LOW (ref 34.5–45)
HGB BLD-MCNC: 8.4 G/DL — LOW (ref 11.5–15.5)
HGB BLD-MCNC: 9.8 G/DL — LOW (ref 11.5–15.5)
IMM GRANULOCYTES NFR BLD AUTO: 1.5 % — SIGNIFICANT CHANGE UP (ref 0–1.5)
LYMPHOCYTES # BLD AUTO: 0.73 K/UL — LOW (ref 1–3.3)
LYMPHOCYTES # BLD AUTO: 7.4 % — LOW (ref 13–44)
MCHC RBC-ENTMCNC: 31 PG — SIGNIFICANT CHANGE UP (ref 27–34)
MCHC RBC-ENTMCNC: 31.2 GM/DL — LOW (ref 32–36)
MCHC RBC-ENTMCNC: 31.5 PG — SIGNIFICANT CHANGE UP (ref 27–34)
MCHC RBC-ENTMCNC: 32.8 GM/DL — SIGNIFICANT CHANGE UP (ref 32–36)
MCV RBC AUTO: 96.1 FL — SIGNIFICANT CHANGE UP (ref 80–100)
MCV RBC AUTO: 99.3 FL — SIGNIFICANT CHANGE UP (ref 80–100)
MONOCYTES # BLD AUTO: 0.15 K/UL — SIGNIFICANT CHANGE UP (ref 0–0.9)
MONOCYTES NFR BLD AUTO: 1.5 % — LOW (ref 2–14)
NEUTROPHILS # BLD AUTO: 8.89 K/UL — HIGH (ref 1.8–7.4)
NEUTROPHILS NFR BLD AUTO: 89.5 % — HIGH (ref 43–77)
NRBC # BLD: 0 /100 WBCS — SIGNIFICANT CHANGE UP (ref 0–0)
NRBC # BLD: 0 /100 WBCS — SIGNIFICANT CHANGE UP (ref 0–0)
PLATELET # BLD AUTO: 177 K/UL — SIGNIFICANT CHANGE UP (ref 150–400)
PLATELET # BLD AUTO: 193 K/UL — SIGNIFICANT CHANGE UP (ref 150–400)
POTASSIUM SERPL-MCNC: 3.7 MMOL/L — SIGNIFICANT CHANGE UP (ref 3.5–5.3)
POTASSIUM SERPL-SCNC: 3.7 MMOL/L — SIGNIFICANT CHANGE UP (ref 3.5–5.3)
RBC # BLD: 2.71 M/UL — LOW (ref 3.8–5.2)
RBC # BLD: 3.11 M/UL — LOW (ref 3.8–5.2)
RBC # FLD: 13.5 % — SIGNIFICANT CHANGE UP (ref 10.3–14.5)
RBC # FLD: 14.2 % — SIGNIFICANT CHANGE UP (ref 10.3–14.5)
SODIUM SERPL-SCNC: 147 MMOL/L — HIGH (ref 135–145)
WBC # BLD: 11.44 K/UL — HIGH (ref 3.8–10.5)
WBC # BLD: 9.93 K/UL — SIGNIFICANT CHANGE UP (ref 3.8–10.5)
WBC # FLD AUTO: 11.44 K/UL — HIGH (ref 3.8–10.5)
WBC # FLD AUTO: 9.93 K/UL — SIGNIFICANT CHANGE UP (ref 3.8–10.5)

## 2019-12-17 PROCEDURE — 22842 INSERT SPINE FIXATION DEVICE: CPT | Mod: 80

## 2019-12-17 PROCEDURE — 88311 DECALCIFY TISSUE: CPT | Mod: 26

## 2019-12-17 PROCEDURE — 99223 1ST HOSP IP/OBS HIGH 75: CPT | Mod: GC

## 2019-12-17 PROCEDURE — 63048 LAM FACETEC &FORAMOT EA ADDL: CPT

## 2019-12-17 PROCEDURE — 22612 ARTHRD PST TQ 1NTRSPC LUMBAR: CPT

## 2019-12-17 PROCEDURE — 72100 X-RAY EXAM L-S SPINE 2/3 VWS: CPT | Mod: 26

## 2019-12-17 PROCEDURE — 63047 LAM FACETEC & FORAMOT LUMBAR: CPT | Mod: 80

## 2019-12-17 PROCEDURE — 22614 ARTHRD PST TQ 1NTRSPC EA ADD: CPT | Mod: 80

## 2019-12-17 PROCEDURE — 22614 ARTHRD PST TQ 1NTRSPC EA ADD: CPT

## 2019-12-17 PROCEDURE — 63047 LAM FACETEC & FORAMOT LUMBAR: CPT

## 2019-12-17 PROCEDURE — 88304 TISSUE EXAM BY PATHOLOGIST: CPT | Mod: 26

## 2019-12-17 PROCEDURE — 22612 ARTHRD PST TQ 1NTRSPC LUMBAR: CPT | Mod: 80

## 2019-12-17 PROCEDURE — 63048 LAM FACETEC &FORAMOT EA ADDL: CPT | Mod: 80

## 2019-12-17 PROCEDURE — 22842 INSERT SPINE FIXATION DEVICE: CPT

## 2019-12-17 RX ORDER — LIDOCAINE HCL 20 MG/ML
0.2 VIAL (ML) INJECTION ONCE
Refills: 0 | Status: COMPLETED | OUTPATIENT
Start: 2019-12-17 | End: 2019-12-17

## 2019-12-17 RX ORDER — HYDROMORPHONE HYDROCHLORIDE 2 MG/ML
1 INJECTION INTRAMUSCULAR; INTRAVENOUS; SUBCUTANEOUS
Refills: 0 | Status: DISCONTINUED | OUTPATIENT
Start: 2019-12-17 | End: 2019-12-18

## 2019-12-17 RX ORDER — ASPIRIN/CALCIUM CARB/MAGNESIUM 324 MG
325 TABLET ORAL DAILY
Refills: 0 | Status: DISCONTINUED | OUTPATIENT
Start: 2019-12-18 | End: 2019-12-21

## 2019-12-17 RX ORDER — ACETAMINOPHEN 500 MG
975 TABLET ORAL EVERY 8 HOURS
Refills: 0 | Status: DISCONTINUED | OUTPATIENT
Start: 2019-12-17 | End: 2019-12-23

## 2019-12-17 RX ORDER — BENZOCAINE AND MENTHOL 5; 1 G/100ML; G/100ML
1 LIQUID ORAL
Refills: 0 | Status: DISCONTINUED | OUTPATIENT
Start: 2019-12-17 | End: 2019-12-19

## 2019-12-17 RX ORDER — NALOXONE HYDROCHLORIDE 4 MG/.1ML
0.1 SPRAY NASAL
Refills: 0 | Status: DISCONTINUED | OUTPATIENT
Start: 2019-12-17 | End: 2019-12-18

## 2019-12-17 RX ORDER — MORPHINE SULFATE 50 MG/1
3 CAPSULE, EXTENDED RELEASE ORAL ONCE
Refills: 0 | Status: DISCONTINUED | OUTPATIENT
Start: 2019-12-17 | End: 2019-12-17

## 2019-12-17 RX ORDER — DEXAMETHASONE 0.5 MG/5ML
2 ELIXIR ORAL EVERY 6 HOURS
Refills: 0 | Status: COMPLETED | OUTPATIENT
Start: 2019-12-19 | End: 2019-12-20

## 2019-12-17 RX ORDER — TRAMADOL HYDROCHLORIDE 50 MG/1
50 TABLET ORAL EVERY 6 HOURS
Refills: 0 | Status: DISCONTINUED | OUTPATIENT
Start: 2019-12-17 | End: 2019-12-18

## 2019-12-17 RX ORDER — DEXAMETHASONE 0.5 MG/5ML
4 ELIXIR ORAL EVERY 6 HOURS
Refills: 0 | Status: COMPLETED | OUTPATIENT
Start: 2019-12-17 | End: 2019-12-18

## 2019-12-17 RX ORDER — LISINOPRIL 2.5 MG/1
20 TABLET ORAL
Refills: 0 | Status: DISCONTINUED | OUTPATIENT
Start: 2019-12-17 | End: 2019-12-17

## 2019-12-17 RX ORDER — PANTOPRAZOLE SODIUM 20 MG/1
40 TABLET, DELAYED RELEASE ORAL
Refills: 0 | Status: DISCONTINUED | OUTPATIENT
Start: 2019-12-17 | End: 2019-12-19

## 2019-12-17 RX ORDER — LANOLIN ALCOHOL/MO/W.PET/CERES
3 CREAM (GRAM) TOPICAL AT BEDTIME
Refills: 0 | Status: DISCONTINUED | OUTPATIENT
Start: 2019-12-17 | End: 2019-12-19

## 2019-12-17 RX ORDER — OXYCODONE HYDROCHLORIDE 5 MG/1
10 TABLET ORAL EVERY 4 HOURS
Refills: 0 | Status: DISCONTINUED | OUTPATIENT
Start: 2019-12-17 | End: 2019-12-17

## 2019-12-17 RX ORDER — DEXAMETHASONE 0.5 MG/5ML
1 ELIXIR ORAL EVERY 6 HOURS
Refills: 0 | Status: COMPLETED | OUTPATIENT
Start: 2019-12-20 | End: 2019-12-21

## 2019-12-17 RX ORDER — CYCLOBENZAPRINE HYDROCHLORIDE 10 MG/1
10 TABLET, FILM COATED ORAL EVERY 8 HOURS
Refills: 0 | Status: DISCONTINUED | OUTPATIENT
Start: 2019-12-17 | End: 2019-12-23

## 2019-12-17 RX ORDER — DEXAMETHASONE 0.5 MG/5ML
4 ELIXIR ORAL EVERY 6 HOURS
Refills: 0 | Status: DISCONTINUED | OUTPATIENT
Start: 2019-12-17 | End: 2019-12-17

## 2019-12-17 RX ORDER — ASCORBIC ACID 60 MG
500 TABLET,CHEWABLE ORAL
Refills: 0 | Status: DISCONTINUED | OUTPATIENT
Start: 2019-12-17 | End: 2019-12-23

## 2019-12-17 RX ORDER — LISINOPRIL 2.5 MG/1
0 TABLET ORAL
Qty: 0 | Refills: 0 | DISCHARGE

## 2019-12-17 RX ORDER — SODIUM CHLORIDE 9 MG/ML
1000 INJECTION, SOLUTION INTRAVENOUS ONCE
Refills: 0 | Status: COMPLETED | OUTPATIENT
Start: 2019-12-17 | End: 2019-12-17

## 2019-12-17 RX ORDER — SODIUM CHLORIDE 9 MG/ML
1000 INJECTION, SOLUTION INTRAVENOUS
Refills: 0 | Status: DISCONTINUED | OUTPATIENT
Start: 2019-12-17 | End: 2019-12-17

## 2019-12-17 RX ORDER — ACETAMINOPHEN 500 MG
1000 TABLET ORAL ONCE
Refills: 0 | Status: COMPLETED | OUTPATIENT
Start: 2019-12-17 | End: 2019-12-17

## 2019-12-17 RX ORDER — MAGNESIUM HYDROXIDE 400 MG/1
30 TABLET, CHEWABLE ORAL EVERY 12 HOURS
Refills: 0 | Status: DISCONTINUED | OUTPATIENT
Start: 2019-12-17 | End: 2019-12-19

## 2019-12-17 RX ORDER — HYDROMORPHONE HYDROCHLORIDE 2 MG/ML
30 INJECTION INTRAMUSCULAR; INTRAVENOUS; SUBCUTANEOUS
Refills: 0 | Status: DISCONTINUED | OUTPATIENT
Start: 2019-12-17 | End: 2019-12-18

## 2019-12-17 RX ORDER — ATORVASTATIN CALCIUM 80 MG/1
40 TABLET, FILM COATED ORAL AT BEDTIME
Refills: 0 | Status: DISCONTINUED | OUTPATIENT
Start: 2019-12-17 | End: 2019-12-23

## 2019-12-17 RX ORDER — HYDROMORPHONE HYDROCHLORIDE 2 MG/ML
0.25 INJECTION INTRAMUSCULAR; INTRAVENOUS; SUBCUTANEOUS ONCE
Refills: 0 | Status: DISCONTINUED | OUTPATIENT
Start: 2019-12-17 | End: 2019-12-17

## 2019-12-17 RX ORDER — CEFAZOLIN SODIUM 1 G
2000 VIAL (EA) INJECTION EVERY 8 HOURS
Refills: 0 | Status: COMPLETED | OUTPATIENT
Start: 2019-12-17 | End: 2019-12-18

## 2019-12-17 RX ORDER — OXYCODONE HYDROCHLORIDE 5 MG/1
5 TABLET ORAL EVERY 4 HOURS
Refills: 0 | Status: DISCONTINUED | OUTPATIENT
Start: 2019-12-17 | End: 2019-12-17

## 2019-12-17 RX ORDER — ONDANSETRON 8 MG/1
4 TABLET, FILM COATED ORAL EVERY 6 HOURS
Refills: 0 | Status: DISCONTINUED | OUTPATIENT
Start: 2019-12-17 | End: 2019-12-18

## 2019-12-17 RX ORDER — PHENYLEPHRINE HYDROCHLORIDE 10 MG/ML
0.5 INJECTION INTRAVENOUS
Qty: 40 | Refills: 0 | Status: DISCONTINUED | OUTPATIENT
Start: 2019-12-17 | End: 2019-12-18

## 2019-12-17 RX ORDER — FOLIC ACID 0.8 MG
1 TABLET ORAL DAILY
Refills: 0 | Status: DISCONTINUED | OUTPATIENT
Start: 2019-12-17 | End: 2019-12-23

## 2019-12-17 RX ORDER — SENNA PLUS 8.6 MG/1
2 TABLET ORAL AT BEDTIME
Refills: 0 | Status: DISCONTINUED | OUTPATIENT
Start: 2019-12-17 | End: 2019-12-23

## 2019-12-17 RX ORDER — DEXAMETHASONE 0.5 MG/5ML
3 ELIXIR ORAL EVERY 6 HOURS
Refills: 0 | Status: COMPLETED | OUTPATIENT
Start: 2019-12-18 | End: 2019-12-19

## 2019-12-17 RX ORDER — HYDROMORPHONE HYDROCHLORIDE 2 MG/ML
0.25 INJECTION INTRAMUSCULAR; INTRAVENOUS; SUBCUTANEOUS
Refills: 0 | Status: DISCONTINUED | OUTPATIENT
Start: 2019-12-17 | End: 2019-12-17

## 2019-12-17 RX ORDER — SODIUM CHLORIDE 9 MG/ML
3 INJECTION INTRAMUSCULAR; INTRAVENOUS; SUBCUTANEOUS EVERY 8 HOURS
Refills: 0 | Status: DISCONTINUED | OUTPATIENT
Start: 2019-12-17 | End: 2019-12-17

## 2019-12-17 RX ORDER — FAMOTIDINE 10 MG/ML
20 INJECTION INTRAVENOUS EVERY 12 HOURS
Refills: 0 | Status: DISCONTINUED | OUTPATIENT
Start: 2019-12-17 | End: 2019-12-19

## 2019-12-17 RX ORDER — SODIUM CHLORIDE 9 MG/ML
1000 INJECTION, SOLUTION INTRAVENOUS
Refills: 0 | Status: DISCONTINUED | OUTPATIENT
Start: 2019-12-17 | End: 2019-12-18

## 2019-12-17 RX ORDER — ONDANSETRON 8 MG/1
4 TABLET, FILM COATED ORAL EVERY 6 HOURS
Refills: 0 | Status: DISCONTINUED | OUTPATIENT
Start: 2019-12-17 | End: 2019-12-19

## 2019-12-17 RX ORDER — PHENYLEPHRINE HYDROCHLORIDE 10 MG/ML
0.5 INJECTION INTRAVENOUS
Qty: 40 | Refills: 0 | Status: DISCONTINUED | OUTPATIENT
Start: 2019-12-17 | End: 2019-12-17

## 2019-12-17 RX ORDER — ATORVASTATIN CALCIUM 80 MG/1
1 TABLET, FILM COATED ORAL
Qty: 0 | Refills: 0 | DISCHARGE

## 2019-12-17 RX ADMIN — SODIUM CHLORIDE 100 MILLILITER(S): 9 INJECTION, SOLUTION INTRAVENOUS at 14:51

## 2019-12-17 RX ADMIN — Medication 0.2 MILLILITER(S): at 07:18

## 2019-12-17 RX ADMIN — PHENYLEPHRINE HYDROCHLORIDE 16.2 MICROGRAM(S)/KG/MIN: 10 INJECTION INTRAVENOUS at 21:35

## 2019-12-17 RX ADMIN — SODIUM CHLORIDE 3 MILLILITER(S): 9 INJECTION INTRAMUSCULAR; INTRAVENOUS; SUBCUTANEOUS at 07:18

## 2019-12-17 RX ADMIN — HYDROMORPHONE HYDROCHLORIDE 30 MILLILITER(S): 2 INJECTION INTRAMUSCULAR; INTRAVENOUS; SUBCUTANEOUS at 20:15

## 2019-12-17 RX ADMIN — MORPHINE SULFATE 3 MILLIGRAM(S): 50 CAPSULE, EXTENDED RELEASE ORAL at 18:15

## 2019-12-17 RX ADMIN — Medication 100 MILLIGRAM(S): at 22:01

## 2019-12-17 RX ADMIN — Medication 1000 MILLIGRAM(S): at 21:30

## 2019-12-17 RX ADMIN — CYCLOBENZAPRINE HYDROCHLORIDE 10 MILLIGRAM(S): 10 TABLET, FILM COATED ORAL at 21:53

## 2019-12-17 RX ADMIN — ONDANSETRON 4 MILLIGRAM(S): 8 TABLET, FILM COATED ORAL at 22:04

## 2019-12-17 RX ADMIN — Medication 400 MILLIGRAM(S): at 21:15

## 2019-12-17 RX ADMIN — HYDROMORPHONE HYDROCHLORIDE 30 MILLILITER(S): 2 INJECTION INTRAMUSCULAR; INTRAVENOUS; SUBCUTANEOUS at 17:14

## 2019-12-17 RX ADMIN — Medication 4 MILLIGRAM(S): at 19:41

## 2019-12-17 RX ADMIN — SODIUM CHLORIDE 1000 MILLILITER(S): 9 INJECTION, SOLUTION INTRAVENOUS at 20:30

## 2019-12-17 RX ADMIN — MORPHINE SULFATE 3 MILLIGRAM(S): 50 CAPSULE, EXTENDED RELEASE ORAL at 18:00

## 2019-12-17 NOTE — PATIENT PROFILE ADULT - NSPROSPHOSPCHAPLAINYN_GEN_A_NUR
[FreeTextEntry6] : here for frequent headache for the last 4 months, headache tend to be one-sided but changes location from right to left and sometime top of head, headache responds to ibuprofen sometimes and also responds to laying down in a dark room, child has been doing poorly in school and he is stressed out over an having to do summer school . denies vomiting or blurred vision, mother has migraine. Also was hit by a baseball to his left arm and had a large hematoma 6 days ago. He also complain about  hypopigmented spots on the back of his head. no

## 2019-12-17 NOTE — PRE-ANESTHESIA EVALUATION ADULT - NSANTHOSAYNRD_GEN_A_CORE
No. ELISA screening performed.  STOP BANG Legend: 0-2 = LOW Risk; 3-4 = INTERMEDIATE Risk; 5-8 = HIGH Risk
No. ELISA screening performed.  STOP BANG Legend: 0-2 = LOW Risk; 3-4 = INTERMEDIATE Risk; 5-8 = HIGH Risk

## 2019-12-17 NOTE — CONSULT NOTE ADULT - ASSESSMENT
79 y.o with PMHx of HTN, HLD, lung Ca s/p lobectomy now s/p C2- C5 laminectomy and fusion     Neuro:  - c/w pain control with PCA   - PRN Tylenol   -c/w tramadol   - melatonin PRN     Resp:   - monitor VSS   - saturating well on RA    Cardio:   - wean thelma as tolerated   - monitor BP   - hold home lisinopril on setting of hypotension     GI:   - CLD   -monitor GI fx     :   -monitor Is and Os   - monitor and replete electrolytes   - c/w LR @100     Heme:   - monitor H/H   - c/w ASA 325mg     ID:   - no active ID issues   - c/w ancef   - c/w decadron taper     Endo:   - no active endocrine issues     Dispo: PACU 79 y.o with PMHx of HTN, HLD, lung Ca s/p lobectomy now s/p L2- L5 laminectomy and fusion     Neuro:  - c/w pain control with PCA   - PRN Tylenol   -c/w tramadol   - melatonin PRN     Resp:   - monitor VSS   - saturating well on RA    Cardio:   - wean thelma as tolerated   - monitor BP   - hold home lisinopril on setting of hypotension     GI:   - CLD   -monitor GI fx     :   -monitor Is and Os   - monitor and replete electrolytes   - c/w LR @100     Heme:   - monitor H/H   - c/w ASA 325mg     ID:   - no active ID issues   - c/w ancef   - c/w decadron taper     Endo:   - no active endocrine issues     Dispo: PACU

## 2019-12-17 NOTE — CONSULT NOTE ADULT - ATTENDING COMMENTS
Pt evaluated in RR  S/P L2-5 fusion  Hypotensive on Neosynephrine  EBL > 1L suspect hypovolemia, Emil requirement decreasing with one unit transfusion and IVF bolus, appropriate response to one unit transfusion  Continue to titrate Emil to MAP > 65, IVF  Awake and alert, mentating well, renal function lactate wnl  Neurologically intact  On .2 mcg of Emil, should be able to wean off within an hour  Continue to monitor    Spoke to RR nurse

## 2019-12-17 NOTE — CONSULT NOTE ADULT - SUBJECTIVE AND OBJECTIVE BOX
SICU CONSULT NOTE    HPI  MAYURI CRAWFORD is a 79y Female with past medical history of HTN, HLD, malignant melanoma, lung CA with Right lobe resection x 10 years now s/p L2-L5 laminectomy and fusion. Patient initially presented as outpatient with back radiating down to leg. Work-up revealed spinal stenosis and patient admitted to hospital today for elective surgery. Post-operatively patient requiring vasopressors. EBL in case was 1000mL, crystalloids 2.9L given, 1.5 UOP. Post-op CBC revealed drop in H/H and and was given IU pRBCs. Repeat CBC demonstrated appropriate response. SICU consulted for hemodynamic monitoring.     PAST MEDICAL HISTORY: Diverticulosis of large intestine with hemorrhage  Melanoma in situ of left shoulder  C. difficile diarrhea  Hyperlipidemia  Ex-smoker  Lung nodules- H/O  Hx of melanoma of skin- leg  >7 years ago  SCC (squamous cell carcinoma)  HTN (hypertension)      PAST SURGICAL HISTORY: H/O colonoscopy  Squamous cell skin cancer  H/O melanoma excision  H/O: hysterectomy- 1982  Status post lung surgery > 7 years ago  lung ca.  Hx of tonsillectomy    SOCIAL HISTORY: former smoker     CODE STATUS: full code     HOME MEDICATIONS:    ALLERGIES: codeine (Unknown)  Neurontin (Other)  quinolines (Other)      VITAL SIGNS:  ICU Vital Signs Last 24 Hrs  T(C): 36.5 (17 Dec 2019 22:00), Max: 36.7 (17 Dec 2019 16:15)  T(F): 97.7 (17 Dec 2019 22:00), Max: 98.1 (17 Dec 2019 16:15)  HR: 79 (17 Dec 2019 22:30) (70 - 90)  BP: 120/71 (17 Dec 2019 22:30) (89/53 - 148/69)  BP(mean): 90 (17 Dec 2019 22:30) (64 - 110)  ABP: 125/62 (17 Dec 2019 22:30) (88/43 - 164/74)  ABP(mean): 88 (17 Dec 2019 22:30) (63 - 111)  RR: 16 (17 Dec 2019 22:00) (14 - 18)  SpO2: 96% (17 Dec 2019 22:30) (92% - 99%)      NEURO  Exam: AAOx3, motor and sensory intact   acetaminophen   Tablet .. 975 milliGRAM(s) Oral every 8 hours  cyclobenzaprine 10 milliGRAM(s) Oral every 8 hours  HYDROmorphone  Injectable 1 milliGRAM(s) IV Push every 10 minutes PRN Severe Pain (7 - 10)  HYDROmorphone PCA (1 mG/mL) 30 milliLiter(s) PCA Continuous PCA Continuous  melatonin 3 milliGRAM(s) Oral at bedtime PRN Sleep  ondansetron Injectable 4 milliGRAM(s) IV Push every 6 hours PRN Nausea  ondansetron Injectable 4 milliGRAM(s) IV Push every 6 hours PRN Nausea  traMADol 50 milliGRAM(s) Oral every 6 hours PRN Mild Pain (1 - 3)      RESPIRATORY  Mechanical Ventilation:   ABG - ( 17 Dec 2019 12:23 )  pH: 7.42  /  pCO2: 38    /  pO2: 243   / HCO3: 24    / Base Excess: .4    /  SaO2: 99      Lactate: x                Exam: CTABL       CARDIOVASCULAR    Exam: RRR  Cardiac Rhythm: NSR   lisinopril 20 milliGRAM(s) Oral two times a day  phenylephrine    Infusion 0.5 MICROgram(s)/kG/Min IV Continuous <Continuous>      GI/NUTRITION  Exam: soft, NT, ND  Diet: CLD   aluminum hydroxide/magnesium hydroxide/simethicone Suspension 30 milliLiter(s) Oral every 12 hours PRN Indigestion  famotidine    Tablet 20 milliGRAM(s) Oral every 12 hours PRN Dyspepsia  magnesium hydroxide Suspension 30 milliLiter(s) Oral every 12 hours PRN Constipation  pantoprazole    Tablet 40 milliGRAM(s) Oral before breakfast  senna 2 Tablet(s) Oral at bedtime      GENITOURINARY/RENAL  ascorbic acid 500 milliGRAM(s) Oral two times a day  calcium carbonate 1250 mG  + Vitamin D (OsCal 500 + D) 1 Tablet(s) Oral three times a day  folic acid 1 milliGRAM(s) Oral daily  lactated ringers. 1000 milliLiter(s) IV Continuous <Continuous>  lactated ringers. 1000 milliLiter(s) IV Continuous <Continuous>  multivitamin 1 Tablet(s) Oral daily      12-17 @ 07:01  -  12-17 @ 23:03  --------------------------------------------------------  IN:    Lactated Ringers IV Bolus: 1000 mL    lactated ringers.: 765 mL    Packed Red Blood Cells: 270 mL    phenylephrine   Infusion: 40.7 mL  Total IN: 2075.7 mL    OUT:    Accordian: 210 mL    Indwelling Catheter - Urethral: 900 mL  Total OUT: 1110 mL    Total NET: 965.7 mL        Weight (kg): 86.4 (12-17 @ 15:40)  12-17    147<H>  |  111<H>  |  13  ----------------------------<  165<H>  3.7   |  22  |  0.56    Ca    8.2<L>      17 Dec 2019 15:01      [x ] Bolden catheter, indication: urine output monitoring in critically ill patient    HEMATOLOGIC  [ ] VTE Prophylaxis:                          9.8    11.44 )-----------( 177      ( 17 Dec 2019 20:58 )             29.9       Transfusion: [ ] PRBC	[ ] Platelets	[ ] FFP	[ ] Cryoprecipitate      INFECTIOUS DISEASES  ceFAZolin   IVPB 2000 milliGRAM(s) IV Intermittent every 8 hours  ceFAZolin   IVPB 2000 milliGRAM(s) IV Intermittent once    RECENT CULTURES:      ENDOCRINE  atorvastatin 40 milliGRAM(s) Oral at bedtime  dexAMETHasone  Injectable 4 milliGRAM(s) IV Push every 6 hours    CAPILLARY BLOOD GLUCOSE          PATIENT CARE ACCESS DEVICES:  [x ] Peripheral IV  [ ] Central Venous Line	[ ] R	[ ] L	[ ] IJ	[ ] Fem	[ ] SC	Placed:   [x ] Arterial Line		[ ] R	[ ] L	[ ] Fem	[ ] Rad	[ ] Ax	Placed:   [ ] PICC:					[ ] Mediport  [ ] Urinary Catheter, Date Placed:   [x] Necessity of urinary, arterial, and venous catheters discussed    OTHER MEDICATIONS: benzocaine 15 mG/menthol 3.6 mG (Sugar-Free) Lozenge 1 Lozenge Oral every 3 hours PRN  naloxone Injectable 0.1 milliGRAM(s) IV Push every 3 minutes PRN

## 2019-12-17 NOTE — CHART NOTE - NSCHARTNOTEFT_GEN_A_CORE
Patient resting with complaint of moderate lumbar incisional pain, on thelma drip postoperatively.  No Chest Pain, SOB, N/V.    Pre op s/sx : LLE pain & radiculopathy ; Post op, patient reports: incisional pain  .    T(C): 36.3 (12-17-19 @ 14:25), Max: 36.4 (12-17-19 @ 06:48)  HR: 70 (12-17-19 @ 15:00) (70 - 86)  BP: 145/89 (12-17-19 @ 15:00) (89/53 - 148/69)  RR: 16 (12-17-19 @ 15:00) (14 - 18)  SpO2: 98% (12-17-19 @ 15:00) (95% - 98%)    Exam:            Dressing: [ ] clean/dry/intact  [x] Other: unable to visualize at time of exam 2/2 to pain         Drains: : HV in place         Sensation: [x] intact to light touch  [ ] decreased:          Motor exam: [  ]          [ ] Upper extremity                      Bi          Tri        Delt                                                    R          5/5        5/5        5/5                                               L          5/5        5/5        5/5                  [ ] Lower extremity                      PF          DF          EHL       FHL                                                                                            R         4+/5        5/5        5/5       5/5                                                        L         4+/5        5/5        5/5       5/5                                                                  Calves Soft/Non-tender bilaterally           [x] warm well perfused; capillary refill <3 seconds              LABS:                        8.4    9.93  )-----------( 193      ( 17 Dec 2019 15:01 )             26.9     12-17    147<H>  |  111<H>  |  13  ----------------------------<  165<H>  3.7   |  22  |  0.56    Ca    8.2<L>      17 Dec 2019 15:01          A/P :  79y Female s/p L2-L5 Laminectomy/PSF    -    Pain control with PCA  -    Taper  -    DVT ppx: SCDs       -    Physical Therapy, OOB when tolerated  -    Weight bearing status: WBAT  -    PACU to floor    Divina Connolly PA-C  Pager # [phone] Patient resting with complaint of moderate lumbar incisional pain, on thelma drip postoperatively.  No Chest Pain, SOB, N/V.    Pre op s/sx : LLE pain & radiculopathy ; Post op, patient reports: incisional pain  .    T(C): 36.3 (12-17-19 @ 14:25), Max: 36.4 (12-17-19 @ 06:48)  HR: 70 (12-17-19 @ 15:00) (70 - 86)  BP: 145/89 (12-17-19 @ 15:00) (89/53 - 148/69)  RR: 16 (12-17-19 @ 15:00) (14 - 18)  SpO2: 98% (12-17-19 @ 15:00) (95% - 98%)    Exam:            Dressing: [ ] clean/dry/intact  [x] Other: unable to visualize at time of exam 2/2 to pain         Drains: : HV in place         Sensation: [x] intact to light touch  [ ] decreased:          Motor exam: [  ]          [ ] Upper extremity                      Bi          Tri        Delt                                                    R          5/5        5/5        5/5                                               L          5/5        5/5        5/5                  [ ] Lower extremity                      PF          DF          EHL       FHL                                                                                            R         4+/5        5/5        5/5       5/5                                                        L         4+/5        5/5        5/5       5/5                                                                  Calves Soft/Non-tender bilaterally           [x] warm well perfused; capillary refill <3 seconds              LABS:                        8.4    9.93  )-----------( 193      ( 17 Dec 2019 15:01 )             26.9     12-17    147<H>  |  111<H>  |  13  ----------------------------<  165<H>  3.7   |  22  |  0.56    Ca    8.2<L>      17 Dec 2019 15:01          A/P :  79y Female s/p L2-L5 Laminectomy/PSF    -    Pain control with PCA  -    Taper  -    DVT ppx:  mg daily    -    Physical Therapy, OOB when tolerated  -    Weight bearing status: WBAT  -    PACU to floor    Divina Connolly PA-C  Pager # [phone] Patient resting with complaint of moderate lumbar incisional pain, on thelma drip postoperatively.  No Chest Pain, SOB, N/V.    Pre op s/sx : LLE pain & radiculopathy ; Post op, patient reports: incisional pain  .    T(C): 36.3 (12-17-19 @ 14:25), Max: 36.4 (12-17-19 @ 06:48)  HR: 70 (12-17-19 @ 15:00) (70 - 86)  BP: 145/89 (12-17-19 @ 15:00) (89/53 - 148/69)  RR: 16 (12-17-19 @ 15:00) (14 - 18)  SpO2: 98% (12-17-19 @ 15:00) (95% - 98%)    Exam:            Dressing: [x] clean/dry/intact  [ ] Other:          Drains: : HV in place         Sensation: [x] intact to light touch  [ ] decreased:          Motor exam: [  ]          [ ] Upper extremity                      Bi          Tri        Delt                                                    R          5/5        5/5        5/5                                               L          5/5        5/5        5/5                  [ ] Lower extremity                      PF          DF          EHL       FHL                                                                                            R         4+/5        5/5        5/5       5/5                                                        L         4+/5        5/5        5/5       5/5                                                                  Calves Soft/Non-tender bilaterally           [x] warm well perfused; capillary refill <3 seconds              LABS:                        8.4    9.93  )-----------( 193      ( 17 Dec 2019 15:01 )             26.9     12-17    147<H>  |  111<H>  |  13  ----------------------------<  165<H>  3.7   |  22  |  0.56    Ca    8.2<L>      17 Dec 2019 15:01          A/P :  79y Female s/p L2-L5 Laminectomy/PSF    -    Pain control with PCA  -    Taper  -    DVT ppx:  mg daily    -    Physical Therapy, OOB when tolerated  -    Weight bearing status: WBAT  -    PACU to floor    Divina Connolly PA-C  Pager # [phone]

## 2019-12-18 LAB
ANION GAP SERPL CALC-SCNC: 8 MMOL/L — SIGNIFICANT CHANGE UP (ref 5–17)
ANION GAP SERPL CALC-SCNC: 8 MMOL/L — SIGNIFICANT CHANGE UP (ref 5–17)
BASOPHILS # BLD AUTO: 0 K/UL — SIGNIFICANT CHANGE UP (ref 0–0.2)
BASOPHILS # BLD AUTO: 0.01 K/UL — SIGNIFICANT CHANGE UP (ref 0–0.2)
BASOPHILS NFR BLD AUTO: 0 % — SIGNIFICANT CHANGE UP (ref 0–2)
BASOPHILS NFR BLD AUTO: 0.1 % — SIGNIFICANT CHANGE UP (ref 0–2)
BUN SERPL-MCNC: 15 MG/DL — SIGNIFICANT CHANGE UP (ref 7–23)
BUN SERPL-MCNC: 16 MG/DL — SIGNIFICANT CHANGE UP (ref 7–23)
CALCIUM SERPL-MCNC: 7.8 MG/DL — LOW (ref 8.4–10.5)
CALCIUM SERPL-MCNC: 7.9 MG/DL — LOW (ref 8.4–10.5)
CHLORIDE SERPL-SCNC: 109 MMOL/L — HIGH (ref 96–108)
CHLORIDE SERPL-SCNC: 110 MMOL/L — HIGH (ref 96–108)
CO2 SERPL-SCNC: 24 MMOL/L — SIGNIFICANT CHANGE UP (ref 22–31)
CO2 SERPL-SCNC: 26 MMOL/L — SIGNIFICANT CHANGE UP (ref 22–31)
CREAT SERPL-MCNC: 0.61 MG/DL — SIGNIFICANT CHANGE UP (ref 0.5–1.3)
CREAT SERPL-MCNC: 0.63 MG/DL — SIGNIFICANT CHANGE UP (ref 0.5–1.3)
EOSINOPHIL # BLD AUTO: 0 K/UL — SIGNIFICANT CHANGE UP (ref 0–0.5)
EOSINOPHIL # BLD AUTO: 0 K/UL — SIGNIFICANT CHANGE UP (ref 0–0.5)
EOSINOPHIL NFR BLD AUTO: 0 % — SIGNIFICANT CHANGE UP (ref 0–6)
EOSINOPHIL NFR BLD AUTO: 0 % — SIGNIFICANT CHANGE UP (ref 0–6)
GAS PNL BLDA: SIGNIFICANT CHANGE UP
GLUCOSE SERPL-MCNC: 153 MG/DL — HIGH (ref 70–99)
GLUCOSE SERPL-MCNC: 154 MG/DL — HIGH (ref 70–99)
HCT VFR BLD CALC: 27.2 % — LOW (ref 34.5–45)
HCT VFR BLD CALC: 29.4 % — LOW (ref 34.5–45)
HCT VFR BLD CALC: 29.7 % — LOW (ref 34.5–45)
HCT VFR BLD CALC: 30.7 % — LOW (ref 34.5–45)
HGB BLD-MCNC: 10 G/DL — LOW (ref 11.5–15.5)
HGB BLD-MCNC: 8.9 G/DL — LOW (ref 11.5–15.5)
HGB BLD-MCNC: 9.4 G/DL — LOW (ref 11.5–15.5)
HGB BLD-MCNC: 9.7 G/DL — LOW (ref 11.5–15.5)
IMM GRANULOCYTES NFR BLD AUTO: 0.5 % — SIGNIFICANT CHANGE UP (ref 0–1.5)
IMM GRANULOCYTES NFR BLD AUTO: 0.7 % — SIGNIFICANT CHANGE UP (ref 0–1.5)
LYMPHOCYTES # BLD AUTO: 0.78 K/UL — LOW (ref 1–3.3)
LYMPHOCYTES # BLD AUTO: 0.79 K/UL — LOW (ref 1–3.3)
LYMPHOCYTES # BLD AUTO: 7.8 % — LOW (ref 13–44)
LYMPHOCYTES # BLD AUTO: 8.6 % — LOW (ref 13–44)
MCHC RBC-ENTMCNC: 30.8 PG — SIGNIFICANT CHANGE UP (ref 27–34)
MCHC RBC-ENTMCNC: 31.2 PG — SIGNIFICANT CHANGE UP (ref 27–34)
MCHC RBC-ENTMCNC: 31.3 PG — SIGNIFICANT CHANGE UP (ref 27–34)
MCHC RBC-ENTMCNC: 31.5 PG — SIGNIFICANT CHANGE UP (ref 27–34)
MCHC RBC-ENTMCNC: 32 GM/DL — SIGNIFICANT CHANGE UP (ref 32–36)
MCHC RBC-ENTMCNC: 32.6 GM/DL — SIGNIFICANT CHANGE UP (ref 32–36)
MCHC RBC-ENTMCNC: 32.7 GM/DL — SIGNIFICANT CHANGE UP (ref 32–36)
MCHC RBC-ENTMCNC: 32.7 GM/DL — SIGNIFICANT CHANGE UP (ref 32–36)
MCV RBC AUTO: 95.4 FL — SIGNIFICANT CHANGE UP (ref 80–100)
MCV RBC AUTO: 96.2 FL — SIGNIFICANT CHANGE UP (ref 80–100)
MCV RBC AUTO: 96.4 FL — SIGNIFICANT CHANGE UP (ref 80–100)
MCV RBC AUTO: 96.4 FL — SIGNIFICANT CHANGE UP (ref 80–100)
MONOCYTES # BLD AUTO: 0.46 K/UL — SIGNIFICANT CHANGE UP (ref 0–0.9)
MONOCYTES # BLD AUTO: 0.46 K/UL — SIGNIFICANT CHANGE UP (ref 0–0.9)
MONOCYTES NFR BLD AUTO: 4.6 % — SIGNIFICANT CHANGE UP (ref 2–14)
MONOCYTES NFR BLD AUTO: 5 % — SIGNIFICANT CHANGE UP (ref 2–14)
NEUTROPHILS # BLD AUTO: 7.84 K/UL — HIGH (ref 1.8–7.4)
NEUTROPHILS # BLD AUTO: 8.66 K/UL — HIGH (ref 1.8–7.4)
NEUTROPHILS NFR BLD AUTO: 85.6 % — HIGH (ref 43–77)
NEUTROPHILS NFR BLD AUTO: 87.1 % — HIGH (ref 43–77)
NRBC # BLD: 0 /100 WBCS — SIGNIFICANT CHANGE UP (ref 0–0)
PLATELET # BLD AUTO: 131 K/UL — LOW (ref 150–400)
PLATELET # BLD AUTO: 140 K/UL — LOW (ref 150–400)
PLATELET # BLD AUTO: 147 K/UL — LOW (ref 150–400)
PLATELET # BLD AUTO: 160 K/UL — SIGNIFICANT CHANGE UP (ref 150–400)
POTASSIUM SERPL-MCNC: 4.1 MMOL/L — SIGNIFICANT CHANGE UP (ref 3.5–5.3)
POTASSIUM SERPL-MCNC: 4.3 MMOL/L — SIGNIFICANT CHANGE UP (ref 3.5–5.3)
POTASSIUM SERPL-SCNC: 4.1 MMOL/L — SIGNIFICANT CHANGE UP (ref 3.5–5.3)
POTASSIUM SERPL-SCNC: 4.3 MMOL/L — SIGNIFICANT CHANGE UP (ref 3.5–5.3)
RBC # BLD: 2.85 M/UL — LOW (ref 3.8–5.2)
RBC # BLD: 3.05 M/UL — LOW (ref 3.8–5.2)
RBC # BLD: 3.08 M/UL — LOW (ref 3.8–5.2)
RBC # BLD: 3.19 M/UL — LOW (ref 3.8–5.2)
RBC # FLD: 14.8 % — HIGH (ref 10.3–14.5)
RBC # FLD: 14.8 % — HIGH (ref 10.3–14.5)
RBC # FLD: 14.9 % — HIGH (ref 10.3–14.5)
RBC # FLD: 15.2 % — HIGH (ref 10.3–14.5)
SODIUM SERPL-SCNC: 142 MMOL/L — SIGNIFICANT CHANGE UP (ref 135–145)
SODIUM SERPL-SCNC: 143 MMOL/L — SIGNIFICANT CHANGE UP (ref 135–145)
WBC # BLD: 8.7 K/UL — SIGNIFICANT CHANGE UP (ref 3.8–10.5)
WBC # BLD: 9.16 K/UL — SIGNIFICANT CHANGE UP (ref 3.8–10.5)
WBC # BLD: 9.45 K/UL — SIGNIFICANT CHANGE UP (ref 3.8–10.5)
WBC # BLD: 9.95 K/UL — SIGNIFICANT CHANGE UP (ref 3.8–10.5)
WBC # FLD AUTO: 8.7 K/UL — SIGNIFICANT CHANGE UP (ref 3.8–10.5)
WBC # FLD AUTO: 9.16 K/UL — SIGNIFICANT CHANGE UP (ref 3.8–10.5)
WBC # FLD AUTO: 9.45 K/UL — SIGNIFICANT CHANGE UP (ref 3.8–10.5)
WBC # FLD AUTO: 9.95 K/UL — SIGNIFICANT CHANGE UP (ref 3.8–10.5)

## 2019-12-18 PROCEDURE — 99232 SBSQ HOSP IP/OBS MODERATE 35: CPT | Mod: GC

## 2019-12-18 PROCEDURE — 71045 X-RAY EXAM CHEST 1 VIEW: CPT | Mod: 26

## 2019-12-18 RX ORDER — OXYCODONE HYDROCHLORIDE 5 MG/1
10 TABLET ORAL EVERY 6 HOURS
Refills: 0 | Status: DISCONTINUED | OUTPATIENT
Start: 2019-12-18 | End: 2019-12-23

## 2019-12-18 RX ORDER — SODIUM CHLORIDE 9 MG/ML
500 INJECTION, SOLUTION INTRAVENOUS ONCE
Refills: 0 | Status: COMPLETED | OUTPATIENT
Start: 2019-12-18 | End: 2019-12-18

## 2019-12-18 RX ORDER — OXYCODONE HYDROCHLORIDE 5 MG/1
5 TABLET ORAL EVERY 4 HOURS
Refills: 0 | Status: DISCONTINUED | OUTPATIENT
Start: 2019-12-18 | End: 2019-12-23

## 2019-12-18 RX ORDER — SODIUM CHLORIDE 9 MG/ML
1000 INJECTION, SOLUTION INTRAVENOUS
Refills: 0 | Status: DISCONTINUED | OUTPATIENT
Start: 2019-12-18 | End: 2019-12-19

## 2019-12-18 RX ORDER — ACETAMINOPHEN 500 MG
1000 TABLET ORAL ONCE
Refills: 0 | Status: COMPLETED | OUTPATIENT
Start: 2019-12-18 | End: 2019-12-18

## 2019-12-18 RX ORDER — ACETAMINOPHEN 500 MG
1000 TABLET ORAL ONCE
Refills: 0 | Status: DISCONTINUED | OUTPATIENT
Start: 2019-12-18 | End: 2019-12-18

## 2019-12-18 RX ORDER — OXYCODONE HYDROCHLORIDE 5 MG/1
10 TABLET ORAL EVERY 4 HOURS
Refills: 0 | Status: DISCONTINUED | OUTPATIENT
Start: 2019-12-18 | End: 2019-12-18

## 2019-12-18 RX ADMIN — HYDROMORPHONE HYDROCHLORIDE 30 MILLILITER(S): 2 INJECTION INTRAMUSCULAR; INTRAVENOUS; SUBCUTANEOUS at 08:02

## 2019-12-18 RX ADMIN — Medication 975 MILLIGRAM(S): at 05:15

## 2019-12-18 RX ADMIN — Medication 1 TABLET(S): at 21:54

## 2019-12-18 RX ADMIN — Medication 325 MILLIGRAM(S): at 12:00

## 2019-12-18 RX ADMIN — CYCLOBENZAPRINE HYDROCHLORIDE 10 MILLIGRAM(S): 10 TABLET, FILM COATED ORAL at 22:05

## 2019-12-18 RX ADMIN — Medication 500 MILLIGRAM(S): at 21:54

## 2019-12-18 RX ADMIN — CYCLOBENZAPRINE HYDROCHLORIDE 10 MILLIGRAM(S): 10 TABLET, FILM COATED ORAL at 05:16

## 2019-12-18 RX ADMIN — Medication 30 MILLILITER(S): at 12:27

## 2019-12-18 RX ADMIN — SENNA PLUS 2 TABLET(S): 8.6 TABLET ORAL at 21:54

## 2019-12-18 RX ADMIN — Medication 975 MILLIGRAM(S): at 21:54

## 2019-12-18 RX ADMIN — Medication 1 TABLET(S): at 10:25

## 2019-12-18 RX ADMIN — Medication 3 MILLIGRAM(S): at 21:54

## 2019-12-18 RX ADMIN — Medication 1000 MILLIGRAM(S): at 13:40

## 2019-12-18 RX ADMIN — ATORVASTATIN CALCIUM 40 MILLIGRAM(S): 80 TABLET, FILM COATED ORAL at 21:54

## 2019-12-18 RX ADMIN — OXYCODONE HYDROCHLORIDE 5 MILLIGRAM(S): 5 TABLET ORAL at 17:10

## 2019-12-18 RX ADMIN — Medication 975 MILLIGRAM(S): at 21:55

## 2019-12-18 RX ADMIN — Medication 100 MILLIGRAM(S): at 05:17

## 2019-12-18 RX ADMIN — SODIUM CHLORIDE 100 MILLILITER(S): 9 INJECTION, SOLUTION INTRAVENOUS at 16:00

## 2019-12-18 RX ADMIN — Medication 1 MILLIGRAM(S): at 16:40

## 2019-12-18 RX ADMIN — Medication 3 MILLIGRAM(S): at 16:39

## 2019-12-18 RX ADMIN — Medication 975 MILLIGRAM(S): at 05:45

## 2019-12-18 RX ADMIN — SODIUM CHLORIDE 100 MILLILITER(S): 9 INJECTION, SOLUTION INTRAVENOUS at 10:36

## 2019-12-18 RX ADMIN — Medication 500 MILLIGRAM(S): at 10:25

## 2019-12-18 RX ADMIN — Medication 1 TABLET(S): at 16:39

## 2019-12-18 RX ADMIN — SODIUM CHLORIDE 1000 MILLILITER(S): 9 INJECTION, SOLUTION INTRAVENOUS at 10:35

## 2019-12-18 RX ADMIN — Medication 4 MILLIGRAM(S): at 02:30

## 2019-12-18 RX ADMIN — Medication 4 MILLIGRAM(S): at 07:35

## 2019-12-18 RX ADMIN — Medication 1 TABLET(S): at 12:00

## 2019-12-18 RX ADMIN — Medication 400 MILLIGRAM(S): at 13:10

## 2019-12-18 RX ADMIN — PANTOPRAZOLE SODIUM 40 MILLIGRAM(S): 20 TABLET, DELAYED RELEASE ORAL at 05:16

## 2019-12-18 RX ADMIN — OXYCODONE HYDROCHLORIDE 5 MILLIGRAM(S): 5 TABLET ORAL at 16:40

## 2019-12-18 NOTE — PHYSICAL THERAPY INITIAL EVALUATION ADULT - PERTINENT HX OF CURRENT PROBLEM, REHAB EVAL
Patient is a 79 year old female with PMHx of HTN, HLD, malignant melanoma of skin (right calf) 1985, with excision of left shoulder in 2017, and former smoker with lung CA with Right lobe resection x 10 years. Pt  reports progressive left sided back pain that radiates down leg. Pt now presents POD#1 s/p L2- L5 lumbar laminectomy with posterior spinal instrumentation and fusion on 12/17/19. Patient is a 79 year old female with PMHx of HTN, HLD, malignant melanoma of skin (right calf) 1985, with excision of left shoulder in 2017, and former smoker with lung CA with Right lobe resection x 10 years. Pt  reports progressive left sided back pain that radiates down leg. Pt now presents POD#1 s/p L2- L5 lumbar laminectomy with posterior spinal instrumentation and fusion on 12/17/19. Pt transferred to SICU post op for pressor support

## 2019-12-18 NOTE — PROGRESS NOTE ADULT - SUBJECTIVE AND OBJECTIVE BOX
SICU DAILY PROGRESS NOTE    MAYURI CRAWFORD is a 79y Female with past medical history of HTN, HLD, malignant melanoma, lung CA with Right lobe resection x 10 years now s/p L2-L5 laminectomy and fusion. Patient initially presented as outpatient with back radiating down to leg. Work-up revealed spinal stenosis and patient admitted to hospital today for elective surgery. Post-operatively patient requiring vasopressors. EBL in case was 1000mL, crystalloids 2.9L given, 1.5 UOP. Post-op CBC revealed drop in H/H and and was given 2U pRBCs. Repeat CBC demonstrated appropriate response. SICU consulted for hemodynamic monitoring.     24 HOUR EVENTS:  - Received 2u pRBCs with appropriate response and improvement in BP  - Due to ongoing hypotension, SICU re-consulted later in PM for hemodynamic monitoring    SUBJECTIVE/ROS:  [ ] A ten-point review of systems was otherwise negative except as noted.  [ ] Due to altered mental status/intubation, subjective information were not able to be obtained from the patient. History was obtained, to the extent possible, from review of the chart and collateral sources of information.      NEURO  RASS:     GCS:     CAM ICU:  Exam: awake, alert, oriented  Meds: acetaminophen   Tablet .. 975 milliGRAM(s) Oral every 8 hours  cyclobenzaprine 10 milliGRAM(s) Oral every 8 hours  melatonin 3 milliGRAM(s) Oral at bedtime PRN Sleep  ondansetron Injectable 4 milliGRAM(s) IV Push every 6 hours PRN Nausea  oxyCODONE    IR 5 milliGRAM(s) Oral every 4 hours PRN Moderate Pain (4 - 6)  oxyCODONE    IR 10 milliGRAM(s) Oral every 6 hours PRN Severe Pain (7 - 10)    [x] Adequacy of sedation and pain control has been assessed and adjusted      RESPIRATORY  RR: 37 (12-19-19 @ 02:00) (15 - 37)  SpO2: 94% (12-19-19 @ 02:00) (87% - 100%)  Wt(kg): --  Exam: unlabored, clear to auscultation bilaterally  Mechanical Ventilation:   ABG - ( 18 Dec 2019 17:59 )  pH: 7.43  /  pCO2: 42    /  pO2: 91    / HCO3: 28    / Base Excess: 3.5   /  SaO2: 98      Lactate: x        [N/A] Extubation Readiness Assessed  Meds:       CARDIOVASCULAR  HR: 109 (12-19-19 @ 02:00) (66 - 109)  BP: 152/83 (12-19-19 @ 02:00) (83/50 - 152/83)  BP(mean): 108 (12-19-19 @ 02:00) (62 - 108)  ABP: 184/99 (12-19-19 @ 02:00) (82/43 - 184/99)  ABP(mean): 137 (12-19-19 @ 02:00) (57 - 137)  Wt(kg): --  CVP(cm H2O): --      Exam: regular rate and rhythm  Cardiac Rhythm: sinus  Perfusion     [x]Adequate   [ ]Inadequate  Mentation   [x]Normal       [ ]Reduced  Extremities  [x]Warm         [ ]Cool  Volume Status [ ]Hypervolemic [x]Euvolemic [ ]Hypovolemic  Meds:       GI/NUTRITION  Exam: soft, nontender, nondistended  Diet: DASH diet regular  Meds: aluminum hydroxide/magnesium hydroxide/simethicone Suspension 30 milliLiter(s) Oral every 12 hours PRN Indigestion  famotidine    Tablet 20 milliGRAM(s) Oral every 12 hours PRN Dyspepsia  magnesium hydroxide Suspension 30 milliLiter(s) Oral every 12 hours PRN Constipation  pantoprazole    Tablet 40 milliGRAM(s) Oral before breakfast  senna 2 Tablet(s) Oral at bedtime      GENITOURINARY  I&O's Detail    12-17 @ 07:01  -  12-18 @ 07:00  --------------------------------------------------------  IN:    IV PiggyBack: 50 mL    Lactated Ringers IV Bolus: 1000 mL    lactated ringers.: 1365 mL    Oral Fluid: 60 mL    Packed Red Blood Cells: 580 mL    phenylephrine   Infusion: 46.7 mL    phenylephrine   Infusion: 4 mL  Total IN: 3105.7 mL    OUT:    Accordian: 240 mL    Indwelling Catheter - Urethral: 1485 mL  Total OUT: 1725 mL    Total NET: 1380.7 mL      12-18 @ 07:01  -  12-19 @ 02:29  --------------------------------------------------------  IN:    lactated ringers.: 800 mL    lactated ringers.: 800 mL    Oral Fluid: 120 mL  Total IN: 1720 mL    OUT:    Indwelling Catheter - Urethral: 1455 mL  Total OUT: 1455 mL    Total NET: 265 mL          12-18    142  |  110<H>  |  16  ----------------------------<  153<H>  4.3   |  24  |  0.63    Ca    7.8<L>      18 Dec 2019 06:13      [ ] Bolden catheter, indication: N/A  Meds: ascorbic acid 500 milliGRAM(s) Oral two times a day  calcium carbonate 1250 mG  + Vitamin D (OsCal 500 + D) 1 Tablet(s) Oral three times a day  folic acid 1 milliGRAM(s) Oral daily  lactated ringers. 1000 milliLiter(s) IV Continuous <Continuous>  multivitamin 1 Tablet(s) Oral daily        HEMATOLOGIC  Meds: aspirin enteric coated 325 milliGRAM(s) Oral daily    [x] VTE Prophylaxis                        9.7    9.22  )-----------( 137      ( 19 Dec 2019 02:18 )             29.9       Transfusion     [ ] PRBC   [ ] Platelets   [ ] FFP   [ ] Cryoprecipitate      INFECTIOUS DISEASES  WBC Count: 9.22 K/uL (12-19 @ 02:18)  WBC Count: 8.70 K/uL (12-18 @ 18:23)  WBC Count: 9.45 K/uL (12-18 @ 10:25)  WBC Count: 9.95 K/uL (12-18 @ 06:13)    RECENT CULTURES:    Meds:       ENDOCRINE  CAPILLARY BLOOD GLUCOSE        Meds: atorvastatin 40 milliGRAM(s) Oral at bedtime  dexAMETHasone   Concentrate 3 milliGRAM(s) Oral every 6 hours  dexAMETHasone   Concentrate 2 milliGRAM(s) Oral every 6 hours        ACCESS DEVICES:  [ ] Peripheral IV  [ ] Central Venous Line	[ ] R	[ ] L	[ ] IJ	[ ] Fem	[ ] SC	Placed:   [ ] Arterial Line		[ ] R	[ ] L	[ ] Fem	[ ] Rad	[ ] Ax	Placed:   [ ] PICC:					[ ] Mediport  [ ] Urinary Catheter, Date Placed:   [x] Necessity of urinary, arterial, and venous catheters discussed    OTHER MEDICATIONS:  benzocaine 15 mG/menthol 3.6 mG (Sugar-Free) Lozenge 1 Lozenge Oral every 3 hours PRN      CODE STATUS:      IMAGING:

## 2019-12-18 NOTE — PHYSICAL THERAPY INITIAL EVALUATION ADULT - STRENGTHENING, PT EVAL
GOAL: Pt will improve bilateral LE strength to 3+/5_, for increased limb stability, to improve gait and facilitate stair negotiation in 4 weeks.

## 2019-12-18 NOTE — PROGRESS NOTE ADULT - ASSESSMENT
79 y.o with PMHx of HTN, HLD, lung Ca s/p lobectomy now s/p L2- L5 laminectomy and fusion admitted to SICU for hemodynamic monitoring    Neuro:  - Pain control tylenol/oxy prn  - Flexeril 10 mg q8h  - Dexamethasone taper    Resp:   - monitor VSS   - saturating well on RA    Cardio:   - monitor BP   - hold home lisinopril on setting of hypotension   - Aspirin 325, atorvastatin 40 (home meds)    GI:   - Regular DASH diet  - Monitor GI fx, Senna   - Calcium Carbonate, MV, Vit C, Folate    :   - monitor Is and Os   - monitor and replete electrolytes   - LR @100 (IVL in AM)    Heme:   - monitor H/H   - VTE ppx per Ortho (currently held)    ID:   - no active ID issues     Endo:   - monitor glucose

## 2019-12-18 NOTE — PROGRESS NOTE ADULT - ATTENDING COMMENTS
79yr F htn hl lung CA now laminectomy L2-5 with intermittent need for phenyleprhine for hypotension, no signs of hypoperfusion.    - continue to monitor SBPs  - trend labs  - trend urine output  - no VTE PPX per ortho  - start diet as tolerated

## 2019-12-18 NOTE — PROVIDER CONTACT NOTE (OTHER) - ASSESSMENT
Pt is hypotensive with SBP between 70-80 mm hg. Pt is currently receiving a PCA dilaudid for pain. Pt states she feels dizzy.

## 2019-12-18 NOTE — CHART NOTE - NSCHARTNOTEFT_GEN_A_CORE
Ortho PA Note      Received call from PACU nurse secondary to patient's SBP "70's-80's."  As per PACU RN, pt on IVF at 100cc/hr and when patient uses PCA, blood pressure noticed to decrease.  Patient received NS bolus of 500cc earlier today after repeat CBC showed H/H 9.7/29.7.    PCA discontinued now.    SICU ([id number]) called to reconsult and informed in subsequent call patient will be going to SICU unit.    Dr. Rob informed of above.      JOSÉ LUIS Andre  Orthopedic Surgery  [phone]

## 2019-12-18 NOTE — PROGRESS NOTE ADULT - SUBJECTIVE AND OBJECTIVE BOX
Pt seen & examined. Pain controlled. Off pressors in PACU, received 2 U of PRBC post operatively, repeat CBC pending this AM.  Patient denies any CP/SOB, overall in good spirits     Vital Signs Last 24 Hrs  T(C): 36.4 (18 Dec 2019 03:00), Max: 36.7 (17 Dec 2019 16:15)  T(F): 97.5 (18 Dec 2019 03:00), Max: 98.1 (17 Dec 2019 16:15)  HR: 81 (18 Dec 2019 06:15) (69 - 93)  BP: 116/58 (18 Dec 2019 06:00) (86/53 - 148/69)  BP(mean): 80 (18 Dec 2019 06:00) (58 - 110)  RR: 16 (18 Dec 2019 06:15) (14 - 18)  SpO2: 95% (18 Dec 2019 06:15) (92% - 100%)      Gen: NAD    Spine:  Dressing clean D&I  HMV in place  +Sensation C5-T1 & L2-S1  Moving all extremities  LLE: 4/5 HF and KE which is baseline, 5/5 DF/PF  RLE: 5/5 HF/KE/DF/PF  Distal pulses intact  Soft compartments, - calf tenderness to palpation

## 2019-12-18 NOTE — PHYSICAL THERAPY INITIAL EVALUATION ADULT - ADDITIONAL COMMENTS
Pt lives in a private house alone   with one flight of steps inside. Pt was Ind with all ADLs and amb without AD.

## 2019-12-18 NOTE — CHART NOTE - NSCHARTNOTEFT_GEN_A_CORE
Patient no s/p 2U of pRBCS. Post-transfusion CBC after first unit demonstrated appropriate response in Hct. Patient now off thelma, hemodynamically stable.   No further SICU intervention indication. Care per primary team     SICU [id number]

## 2019-12-18 NOTE — PROGRESS NOTE ADULT - SUBJECTIVE AND OBJECTIVE BOX
Day 1 of Anesthesia Pain Management Service    SUBJECTIVE: I'm doing ok    Pain Scale Score:	[X] Refer to charted pain scores    THERAPY:    [ ] IV PCA Morphine		[ ] 5 mg/mL	[ ] 1 mg/mL  [X] IV PCA Hydromorphone	[ ] 5 mg/mL	[X] 1 mg/mL  [ ] IV PCA Fentanyl		[ ] 50 micrograms/mL    Demand dose: 0.2 mg     Lockout: 6 minutes   Continuous Rate: 0 mg/hr  4 Hour Limit: 4 mg    MEDICATIONS  (STANDING):  acetaminophen   Tablet .. 975 milliGRAM(s) Oral every 8 hours  ascorbic acid 500 milliGRAM(s) Oral two times a day  aspirin enteric coated 325 milliGRAM(s) Oral daily  atorvastatin 40 milliGRAM(s) Oral at bedtime  calcium carbonate 1250 mG  + Vitamin D (OsCal 500 + D) 1 Tablet(s) Oral three times a day  ceFAZolin   IVPB 2000 milliGRAM(s) IV Intermittent once  cyclobenzaprine 10 milliGRAM(s) Oral every 8 hours  dexAMETHasone   Concentrate 3 milliGRAM(s) Oral every 6 hours  dexAMETHasone  Injectable 4 milliGRAM(s) IV Push every 6 hours  folic acid 1 milliGRAM(s) Oral daily  HYDROmorphone PCA (1 mG/mL) 30 milliLiter(s) PCA Continuous PCA Continuous  lactated ringers. 1000 milliLiter(s) (100 mL/Hr) IV Continuous <Continuous>  multivitamin 1 Tablet(s) Oral daily  pantoprazole    Tablet 40 milliGRAM(s) Oral before breakfast  phenylephrine    Infusion 0.5 MICROgram(s)/kG/Min (16.2 mL/Hr) IV Continuous <Continuous>  senna 2 Tablet(s) Oral at bedtime    MEDICATIONS  (PRN):  aluminum hydroxide/magnesium hydroxide/simethicone Suspension 30 milliLiter(s) Oral every 12 hours PRN Indigestion  benzocaine 15 mG/menthol 3.6 mG (Sugar-Free) Lozenge 1 Lozenge Oral every 3 hours PRN Sore Throat  famotidine    Tablet 20 milliGRAM(s) Oral every 12 hours PRN Dyspepsia  HYDROmorphone  Injectable 1 milliGRAM(s) IV Push every 10 minutes PRN Severe Pain (7 - 10)  magnesium hydroxide Suspension 30 milliLiter(s) Oral every 12 hours PRN Constipation  melatonin 3 milliGRAM(s) Oral at bedtime PRN Sleep  naloxone Injectable 0.1 milliGRAM(s) IV Push every 3 minutes PRN For ANY of the following changes in patient status:  A. RR LESS THAN 10 breaths per minute, B. Oxygen saturation LESS THAN 90%, C. Sedation score of 6  ondansetron Injectable 4 milliGRAM(s) IV Push every 6 hours PRN Nausea  ondansetron Injectable 4 milliGRAM(s) IV Push every 6 hours PRN Nausea  traMADol 50 milliGRAM(s) Oral every 6 hours PRN Mild Pain (1 - 3)      OBJECTIVE:    Sedation Score:	[ X] Alert 	[ ] Drowsy 	[ ] Arousable	[ ] Asleep	[ ] Unresponsive    Side Effects:	[X ] None	[ ] Nausea	[ ] Vomiting	[ ] Pruritus  		[ ] Other:    Vital Signs Last 24 Hrs  T(C): 36.5 (18 Dec 2019 08:00), Max: 36.7 (17 Dec 2019 16:15)  T(F): 97.7 (18 Dec 2019 08:00), Max: 98.1 (17 Dec 2019 16:15)  HR: 75 (18 Dec 2019 08:00) (69 - 93)  BP: 96/54 (18 Dec 2019 08:00) (86/53 - 148/69)  BP(mean): 70 (18 Dec 2019 08:00) (58 - 110)  RR: 16 (18 Dec 2019 08:00) (14 - 18)  SpO2: 98% (18 Dec 2019 08:00) (92% - 100%)    ASSESSMENT/ PLAN    Therapy to  be:               [X] Continued   [ ] Discontinued   [ ] Changed to PRN Analgesics    Documentation and Verification of current medications:   [X] Done	[ ] Not done, not eligible    Comments: PCA use 1-4x/hr. Pain controlled with PCA. Reeducated to use.

## 2019-12-18 NOTE — PROGRESS NOTE ADULT - ASSESSMENT
79 F s/p L2-L5 Lami/PSF POD 1    BP improving in pacu, off pressors, stable for ortho floor  Analgesia- PCA  DVT ppx-  qd, Eliquis 2.5mg BID starting POD 5  ancef q 24hrs  monitor HMV output  Taper  Incentive spirometry  WBAT in LSO brace  P/T, OOBTC   Will d/w Dr Rob 79 F s/p L2-L5 Lami/PSF POD 1    BP improving in pacu, off pressors, stable for ortho floor  Analgesia- PCA  DVT ppx-  qd, Eliquis 2.5mg BID starting POD 5  ancef q 24hrs  monitor HMV output  Taper  Incentive spirometry  WBAT in LSO brace  P/T, OOBTC   Will d/w Dr Rob    Attending:  patient seen and examined-- in good spirits and more stable hemodynamically     agree with housestaff assessment    Plan as per above    Darron BOWMAN

## 2019-12-18 NOTE — PROGRESS NOTE ADULT - SUBJECTIVE AND OBJECTIVE BOX
Pain Management Attending Addendum    SUBJECTIVE:    Therapy:	  PCAx	   Epidural           s/p Spinal Opoid              Postpartum infusion	  Patient controlled regional anesthesia (PCRA)    prn Analgesics    OBJECTIVE: No new signs    x  Other:    Side Effects:    None	x		 Other:    Assessment of Catheter Site:		 Intact		 Other:    ASSESSMENT/PLAN  Continue current therapyx    Therapy changed to:     IV PCA        Epidural      prn Analgesics     post partum infusion    Comments:

## 2019-12-18 NOTE — PHYSICAL THERAPY INITIAL EVALUATION ADULT - CRITERIA FOR SKILLED THERAPEUTIC INTERVENTIONS
anticipated discharge recommendation/impairments found/functional limitations in following categories impairments found/functional limitations in following categories/risk reduction/prevention/rehab potential

## 2019-12-18 NOTE — PHYSICAL THERAPY INITIAL EVALUATION ADULT - PLANNED THERAPY INTERVENTIONS, PT EVAL
transfer training/gait training/bed mobility training gait training/transfer training/bed mobility training/strengthening

## 2019-12-19 LAB
ANION GAP SERPL CALC-SCNC: 7 MMOL/L — SIGNIFICANT CHANGE UP (ref 5–17)
BLD GP AB SCN SERPL QL: NEGATIVE — SIGNIFICANT CHANGE UP
BLD GP AB SCN SERPL QL: NEGATIVE — SIGNIFICANT CHANGE UP
BUN SERPL-MCNC: 16 MG/DL — SIGNIFICANT CHANGE UP (ref 7–23)
CALCIUM SERPL-MCNC: 9.2 MG/DL — SIGNIFICANT CHANGE UP (ref 8.4–10.5)
CHLORIDE SERPL-SCNC: 108 MMOL/L — SIGNIFICANT CHANGE UP (ref 96–108)
CO2 SERPL-SCNC: 27 MMOL/L — SIGNIFICANT CHANGE UP (ref 22–31)
CREAT SERPL-MCNC: 0.59 MG/DL — SIGNIFICANT CHANGE UP (ref 0.5–1.3)
GLUCOSE SERPL-MCNC: 138 MG/DL — HIGH (ref 70–99)
HCT VFR BLD CALC: 29.9 % — LOW (ref 34.5–45)
HGB BLD-MCNC: 9.7 G/DL — LOW (ref 11.5–15.5)
MAGNESIUM SERPL-MCNC: 2.3 MG/DL — SIGNIFICANT CHANGE UP (ref 1.6–2.6)
MCHC RBC-ENTMCNC: 31.4 PG — SIGNIFICANT CHANGE UP (ref 27–34)
MCHC RBC-ENTMCNC: 32.4 GM/DL — SIGNIFICANT CHANGE UP (ref 32–36)
MCV RBC AUTO: 96.8 FL — SIGNIFICANT CHANGE UP (ref 80–100)
NRBC # BLD: 0 /100 WBCS — SIGNIFICANT CHANGE UP (ref 0–0)
PHOSPHATE SERPL-MCNC: 2.3 MG/DL — LOW (ref 2.5–4.5)
PLATELET # BLD AUTO: 137 K/UL — LOW (ref 150–400)
POTASSIUM SERPL-MCNC: 4.1 MMOL/L — SIGNIFICANT CHANGE UP (ref 3.5–5.3)
POTASSIUM SERPL-SCNC: 4.1 MMOL/L — SIGNIFICANT CHANGE UP (ref 3.5–5.3)
RBC # BLD: 3.09 M/UL — LOW (ref 3.8–5.2)
RBC # FLD: 14.7 % — HIGH (ref 10.3–14.5)
RH IG SCN BLD-IMP: POSITIVE — SIGNIFICANT CHANGE UP
RH IG SCN BLD-IMP: POSITIVE — SIGNIFICANT CHANGE UP
SODIUM SERPL-SCNC: 142 MMOL/L — SIGNIFICANT CHANGE UP (ref 135–145)
WBC # BLD: 9.22 K/UL — SIGNIFICANT CHANGE UP (ref 3.8–10.5)
WBC # FLD AUTO: 9.22 K/UL — SIGNIFICANT CHANGE UP (ref 3.8–10.5)

## 2019-12-19 PROCEDURE — 99232 SBSQ HOSP IP/OBS MODERATE 35: CPT | Mod: GC

## 2019-12-19 RX ORDER — POLYETHYLENE GLYCOL 3350 17 G/17G
17 POWDER, FOR SOLUTION ORAL DAILY
Refills: 0 | Status: DISCONTINUED | OUTPATIENT
Start: 2019-12-19 | End: 2019-12-23

## 2019-12-19 RX ADMIN — Medication 975 MILLIGRAM(S): at 05:41

## 2019-12-19 RX ADMIN — Medication 3 MILLIGRAM(S): at 05:40

## 2019-12-19 RX ADMIN — Medication 1 MILLIGRAM(S): at 11:09

## 2019-12-19 RX ADMIN — OXYCODONE HYDROCHLORIDE 10 MILLIGRAM(S): 5 TABLET ORAL at 15:00

## 2019-12-19 RX ADMIN — OXYCODONE HYDROCHLORIDE 10 MILLIGRAM(S): 5 TABLET ORAL at 22:40

## 2019-12-19 RX ADMIN — Medication 1 TABLET(S): at 13:07

## 2019-12-19 RX ADMIN — Medication 975 MILLIGRAM(S): at 05:40

## 2019-12-19 RX ADMIN — Medication 1 TABLET(S): at 22:10

## 2019-12-19 RX ADMIN — Medication 325 MILLIGRAM(S): at 11:09

## 2019-12-19 RX ADMIN — Medication 2 MILLIGRAM(S): at 22:11

## 2019-12-19 RX ADMIN — Medication 3 MILLIGRAM(S): at 10:47

## 2019-12-19 RX ADMIN — SENNA PLUS 2 TABLET(S): 8.6 TABLET ORAL at 22:10

## 2019-12-19 RX ADMIN — Medication 975 MILLIGRAM(S): at 13:22

## 2019-12-19 RX ADMIN — CYCLOBENZAPRINE HYDROCHLORIDE 10 MILLIGRAM(S): 10 TABLET, FILM COATED ORAL at 22:10

## 2019-12-19 RX ADMIN — Medication 1 TABLET(S): at 11:09

## 2019-12-19 RX ADMIN — POLYETHYLENE GLYCOL 3350 17 GRAM(S): 17 POWDER, FOR SOLUTION ORAL at 13:07

## 2019-12-19 RX ADMIN — CYCLOBENZAPRINE HYDROCHLORIDE 10 MILLIGRAM(S): 10 TABLET, FILM COATED ORAL at 05:40

## 2019-12-19 RX ADMIN — OXYCODONE HYDROCHLORIDE 10 MILLIGRAM(S): 5 TABLET ORAL at 08:10

## 2019-12-19 RX ADMIN — Medication 500 MILLIGRAM(S): at 05:40

## 2019-12-19 RX ADMIN — OXYCODONE HYDROCHLORIDE 10 MILLIGRAM(S): 5 TABLET ORAL at 02:09

## 2019-12-19 RX ADMIN — Medication 1 TABLET(S): at 05:40

## 2019-12-19 RX ADMIN — OXYCODONE HYDROCHLORIDE 10 MILLIGRAM(S): 5 TABLET ORAL at 14:30

## 2019-12-19 RX ADMIN — OXYCODONE HYDROCHLORIDE 10 MILLIGRAM(S): 5 TABLET ORAL at 22:10

## 2019-12-19 RX ADMIN — Medication 2 MILLIGRAM(S): at 16:02

## 2019-12-19 RX ADMIN — ATORVASTATIN CALCIUM 40 MILLIGRAM(S): 80 TABLET, FILM COATED ORAL at 22:10

## 2019-12-19 RX ADMIN — OXYCODONE HYDROCHLORIDE 10 MILLIGRAM(S): 5 TABLET ORAL at 08:40

## 2019-12-19 RX ADMIN — Medication 975 MILLIGRAM(S): at 13:07

## 2019-12-19 RX ADMIN — CYCLOBENZAPRINE HYDROCHLORIDE 10 MILLIGRAM(S): 10 TABLET, FILM COATED ORAL at 14:28

## 2019-12-19 RX ADMIN — OXYCODONE HYDROCHLORIDE 5 MILLIGRAM(S): 5 TABLET ORAL at 11:12

## 2019-12-19 RX ADMIN — Medication 500 MILLIGRAM(S): at 17:49

## 2019-12-19 RX ADMIN — OXYCODONE HYDROCHLORIDE 10 MILLIGRAM(S): 5 TABLET ORAL at 02:44

## 2019-12-19 RX ADMIN — Medication 83.33 MILLIMOLE(S): at 05:41

## 2019-12-19 RX ADMIN — OXYCODONE HYDROCHLORIDE 5 MILLIGRAM(S): 5 TABLET ORAL at 10:47

## 2019-12-19 RX ADMIN — Medication 975 MILLIGRAM(S): at 22:40

## 2019-12-19 RX ADMIN — Medication 975 MILLIGRAM(S): at 22:10

## 2019-12-19 NOTE — DIETITIAN INITIAL EVALUATION ADULT. - FACTORS AFF FOOD INTAKE
none/Pt is eating very well and enjoying the food, however with complaint of constipation. RD will add prunes to pt menu per request.

## 2019-12-19 NOTE — PROGRESS NOTE ADULT - ATTENDING COMMENTS
ON: off pressors, no transfusion requirements overnight (2u in AM yesterday)  neuro intact  no BM yet  adequate urine output    - pain control per prior   - dc IV maintenance  - daily labs  - dc snow, trial of void  - dc a line  - PT OT  - may go to floor

## 2019-12-19 NOTE — DIETITIAN INITIAL EVALUATION ADULT. - ADD RECOMMEND
1) Continue DASH diet. 2) Continue micronutrient supplementation (multivitamin, vit C, folic acid, Calcium with vit D) as medically appropriate. 3) Therapeutic diet education provided.

## 2019-12-19 NOTE — PROGRESS NOTE ADULT - ASSESSMENT
79 y.o. F with PMHx of HTN, HLD, lung Ca s/p lobectomy now s/p L2- L5 laminectomy and fusion admitted to SICU for hemodynamic monitoring    Neuro:  - Pain control tylenol/oxy prn  - Flexeril 10 mg q8h  - Dexamethasone taper    Resp:   - monitor VSS   - saturating well on RA    Cardio:   - monitor BP   - hold home lisinopril on setting of hypotension   - Aspirin 325, atorvastatin 40 (home meds)    GI:   - Regular DASH diet  - Monitor GI fx, Senna   - Calcium Carbonate, MV, Vit C, Folate    :   - monitor Is and Os   - monitor and replete electrolytes   - LR @100 (IVL in AM)    Heme:   - monitor H/H   - VTE ppx per Ortho (currently held)    ID:   - no active ID issues     Endo:   - monitor glucose

## 2019-12-19 NOTE — DIETITIAN INITIAL EVALUATION ADULT. - OTHER INFO
INFORMATION PTA  Diet PTA: Regular diet with ample protein and vegetables. Pt does acknowledge high intake of salty snacks and refined carbohydrates.  Nutrition Supplements PTA: pt takes daily fiber powder supplement  Food Allergies: NKFA  Weight History PTA: Pt endorses 45 pound weight gain in past 10 years.      INFORMATION THIS ADMISSION  Last BM: none, constipation  Therapeutic Diet Education Provided: Reviewed Heart Healthy diet guidelines; pt was receptive but does not want to cut back on her favorite foods (potato chips, bread, etc.)

## 2019-12-19 NOTE — DIETITIAN INITIAL EVALUATION ADULT. - PHYSICAL APPEARANCE
other (specify)/ht: 5 feet 4 inches, admit wt: 190 pounds, BMI: 31.7 Kg/m2, IBW: 120 pounds (+/- 10%), 158% IBW/well nourished/obese Edema: 1+ generalized  Skin: no pressure injuries noted per nursing flowsheet  Nutrition Focused Physical Exam: Pt appears well developed, obese, with no signs of muscle or fat depletion.

## 2019-12-19 NOTE — DIETITIAN INITIAL EVALUATION ADULT. - PERTINENT MEDS FT
MEDICATIONS  (STANDING):  acetaminophen   Tablet .. 975 milliGRAM(s) Oral every 8 hours  ascorbic acid 500 milliGRAM(s) Oral two times a day  aspirin enteric coated 325 milliGRAM(s) Oral daily  atorvastatin 40 milliGRAM(s) Oral at bedtime  calcium carbonate 1250 mG  + Vitamin D (OsCal 500 + D) 1 Tablet(s) Oral three times a day  cyclobenzaprine 10 milliGRAM(s) Oral every 8 hours  dexAMETHasone   Concentrate 2 milliGRAM(s) Oral every 6 hours  folic acid 1 milliGRAM(s) Oral daily  lactated ringers. 1000 milliLiter(s) (100 mL/Hr) IV Continuous <Continuous>  multivitamin 1 Tablet(s) Oral daily  polyethylene glycol 3350 17 Gram(s) Oral daily  senna 2 Tablet(s) Oral at bedtime

## 2019-12-19 NOTE — PROGRESS NOTE ADULT - SUBJECTIVE AND OBJECTIVE BOX
Orthopaedic Surgery Progress Note    Subjective:   Patient seen and examined  No acute events overnight  Pain improving slowly  Off pressors and stable hemodynamically    Objective:  T(C): 37.1 (12-19-19 @ 03:00), Max: 37.4 (12-18-19 @ 15:05)  HR: 73 (12-19-19 @ 06:00) (66 - 109)  BP: 121/68 (12-19-19 @ 06:00) (83/50 - 152/83)  RR: 19 (12-19-19 @ 06:00) (15 - 37)  SpO2: 93% (12-19-19 @ 06:00) (87% - 100%)    12-17 @ 07:01  -  12-18 @ 07:00  --------------------------------------------------------  IN: 3105.7 mL / OUT: 1725 mL / NET: 1380.7 mL    12-18 @ 07:01  -  12-19 @ 06:46  --------------------------------------------------------  IN: 2345 mL / OUT: 1855 mL / NET: 490 mL    PE  NAD  Back:   dressing C/D/I, mild appropriate natalie-incisional ttp  HMV in place w/ serosanguinous output  Neuro:  RLE IP 4/5 HS 3+/5* Q 3+/5* GS 5/5 TA 5/5 EHL 5/5   SILT L2-S1  LLE IP 4/5 HS 3+/5* Q 3+/5* GS 5/5 TA 5/5 EHL 5/5  *exam limited by patient effort due to back pain  SILT L2-S1  WWP BLE                        9.7    9.22  )-----------( 137      ( 19 Dec 2019 02:18 )             29.9     12-19    142  |  108  |  16  ----------------------------<  138<H>  4.1   |  27  |  0.59    Ca    9.2      19 Dec 2019 02:18  Phos  2.3     12-19  Mg     2.3     12-19    79 F s/p L2-L5 Lami/PSF POD 2    HD stable  Analgesia- PCA, wean as tolerated to PO meds  DVT ppx-  qd, Eliquis 2.5mg BID starting POD 5  monitor HMV output  Taper  Incentive spirometry  WBAT in LSO brace  P/T, OOBTC   Care per SICU  Likely stable for transfer to [facility] today    Sharif Nuno MD

## 2019-12-19 NOTE — DIETITIAN INITIAL EVALUATION ADULT. - PERTINENT LABORATORY DATA
12-19 @ 02:18: Sodium 142, Potassium 4.1, Chloride 108, Calcium 9.2, Magnesium 2.3, Phosphorus 2.3<L>, BUN 16, Creatinine 0.59, <H>, Hemoglobin 9.7<L>, Hematocrit 29.9<L>

## 2019-12-20 ENCOUNTER — TRANSCRIPTION ENCOUNTER (OUTPATIENT)
Age: 79
End: 2019-12-20

## 2019-12-20 LAB
ANION GAP SERPL CALC-SCNC: 10 MMOL/L — SIGNIFICANT CHANGE UP (ref 5–17)
BUN SERPL-MCNC: 19 MG/DL — SIGNIFICANT CHANGE UP (ref 7–23)
CALCIUM SERPL-MCNC: 9.8 MG/DL — SIGNIFICANT CHANGE UP (ref 8.4–10.5)
CHLORIDE SERPL-SCNC: 104 MMOL/L — SIGNIFICANT CHANGE UP (ref 96–108)
CO2 SERPL-SCNC: 29 MMOL/L — SIGNIFICANT CHANGE UP (ref 22–31)
CREAT SERPL-MCNC: 0.66 MG/DL — SIGNIFICANT CHANGE UP (ref 0.5–1.3)
GLUCOSE SERPL-MCNC: 127 MG/DL — HIGH (ref 70–99)
HCT VFR BLD CALC: 31.5 % — LOW (ref 34.5–45)
HGB BLD-MCNC: 10 G/DL — LOW (ref 11.5–15.5)
MCHC RBC-ENTMCNC: 31 PG — SIGNIFICANT CHANGE UP (ref 27–34)
MCHC RBC-ENTMCNC: 31.7 GM/DL — LOW (ref 32–36)
MCV RBC AUTO: 97.5 FL — SIGNIFICANT CHANGE UP (ref 80–100)
PLATELET # BLD AUTO: 156 K/UL — SIGNIFICANT CHANGE UP (ref 150–400)
POTASSIUM SERPL-MCNC: 4.2 MMOL/L — SIGNIFICANT CHANGE UP (ref 3.5–5.3)
POTASSIUM SERPL-SCNC: 4.2 MMOL/L — SIGNIFICANT CHANGE UP (ref 3.5–5.3)
RBC # BLD: 3.23 M/UL — LOW (ref 3.8–5.2)
RBC # FLD: 14 % — SIGNIFICANT CHANGE UP (ref 10.3–14.5)
SODIUM SERPL-SCNC: 143 MMOL/L — SIGNIFICANT CHANGE UP (ref 135–145)
WBC # BLD: 8.92 K/UL — SIGNIFICANT CHANGE UP (ref 3.8–10.5)
WBC # FLD AUTO: 8.92 K/UL — SIGNIFICANT CHANGE UP (ref 3.8–10.5)

## 2019-12-20 RX ADMIN — OXYCODONE HYDROCHLORIDE 5 MILLIGRAM(S): 5 TABLET ORAL at 13:58

## 2019-12-20 RX ADMIN — OXYCODONE HYDROCHLORIDE 10 MILLIGRAM(S): 5 TABLET ORAL at 06:01

## 2019-12-20 RX ADMIN — Medication 975 MILLIGRAM(S): at 06:00

## 2019-12-20 RX ADMIN — OXYCODONE HYDROCHLORIDE 10 MILLIGRAM(S): 5 TABLET ORAL at 06:30

## 2019-12-20 RX ADMIN — Medication 1 TABLET(S): at 05:32

## 2019-12-20 RX ADMIN — Medication 1 TABLET(S): at 14:00

## 2019-12-20 RX ADMIN — OXYCODONE HYDROCHLORIDE 5 MILLIGRAM(S): 5 TABLET ORAL at 14:30

## 2019-12-20 RX ADMIN — Medication 1 TABLET(S): at 12:22

## 2019-12-20 RX ADMIN — Medication 975 MILLIGRAM(S): at 18:10

## 2019-12-20 RX ADMIN — Medication 2 MILLIGRAM(S): at 05:33

## 2019-12-20 RX ADMIN — Medication 1 TABLET(S): at 21:58

## 2019-12-20 RX ADMIN — Medication 500 MILLIGRAM(S): at 05:32

## 2019-12-20 RX ADMIN — CYCLOBENZAPRINE HYDROCHLORIDE 10 MILLIGRAM(S): 10 TABLET, FILM COATED ORAL at 14:00

## 2019-12-20 RX ADMIN — Medication 1 MILLIGRAM(S): at 12:22

## 2019-12-20 RX ADMIN — POLYETHYLENE GLYCOL 3350 17 GRAM(S): 17 POWDER, FOR SOLUTION ORAL at 12:21

## 2019-12-20 RX ADMIN — Medication 325 MILLIGRAM(S): at 12:22

## 2019-12-20 RX ADMIN — Medication 2 MILLIGRAM(S): at 10:38

## 2019-12-20 RX ADMIN — Medication 975 MILLIGRAM(S): at 05:32

## 2019-12-20 RX ADMIN — Medication 10 MILLIGRAM(S): at 12:22

## 2019-12-20 RX ADMIN — Medication 500 MILLIGRAM(S): at 18:07

## 2019-12-20 RX ADMIN — Medication 1 MILLIGRAM(S): at 18:07

## 2019-12-20 RX ADMIN — SENNA PLUS 2 TABLET(S): 8.6 TABLET ORAL at 21:59

## 2019-12-20 RX ADMIN — OXYCODONE HYDROCHLORIDE 10 MILLIGRAM(S): 5 TABLET ORAL at 18:07

## 2019-12-20 RX ADMIN — CYCLOBENZAPRINE HYDROCHLORIDE 10 MILLIGRAM(S): 10 TABLET, FILM COATED ORAL at 05:32

## 2019-12-20 RX ADMIN — CYCLOBENZAPRINE HYDROCHLORIDE 10 MILLIGRAM(S): 10 TABLET, FILM COATED ORAL at 21:58

## 2019-12-20 RX ADMIN — ATORVASTATIN CALCIUM 40 MILLIGRAM(S): 80 TABLET, FILM COATED ORAL at 21:59

## 2019-12-20 NOTE — DISCHARGE NOTE PROVIDER - CARE PROVIDER_API CALL
Praveen Rob)  Orthopaedic Surgery  [address], [address]  [place] [address]  Phone: [phone]  Fax: [phone]  Follow Up Time:

## 2019-12-20 NOTE — DISCHARGE NOTE PROVIDER - NSDCCPTREATMENT_GEN_ALL_CORE_FT
PRINCIPAL PROCEDURE  Procedure: Lumbar laminectomy with pedicle screw fusion  Findings and Treatment:

## 2019-12-20 NOTE — OCCUPATIONAL THERAPY INITIAL EVALUATION ADULT - PERTINENT HX OF CURRENT PROBLEM, REHAB EVAL
Patient is a 79 year old female with PMHx of HTN, HLD, malignant melanoma of skin (right calf) 1985, with excision of left shoulder in 2017, and former smoker with lung CA with Right lobe resection x 10 years. Pt  reports progressive left sided back pain that radiates down leg. Pt now presents POD#1 s/p L2- L5 lumbar laminectomy with posterior spinal instrumentation and fusion on 12/17/19. Pt transferred to SICU post op for pressor support

## 2019-12-20 NOTE — PROGRESS NOTE ADULT - SUBJECTIVE AND OBJECTIVE BOX
Orthopaedic Surgery Progress Note    Subjective:   Patient seen and examined at bedside this am. Pain is well controlled. Pt reports of some tingling in BL toes, no other complaints at this time. Pt is overall improving.         Vital Signs Last 24 Hrs  T(C): 36.5 (20 Dec 2019 05:10), Max: 37.3 (19 Dec 2019 11:00)  T(F): 97.7 (20 Dec 2019 05:10), Max: 99.1 (19 Dec 2019 11:00)  HR: 79 (20 Dec 2019 05:10) (72 - 93)  BP: 158/89 (20 Dec 2019 05:10) (114/70 - 158/89)  BP(mean): 98 (19 Dec 2019 13:00) (94 - 111)  RR: 18 (20 Dec 2019 05:10) (18 - 47)  SpO2: 94% (20 Dec 2019 05:10) (92% - 98%)    PE  NAD  Back:   dressing C/D/I, mild appropriate natalie-incisional ttp  HMV in place w/ serosanguinous output  Neuro:  RLE IP 4/5 HS 3+/5* Q 3+/5* GS 5/5 TA 5/5 EHL 5/5   SILT L2-S1  LLE IP 4/5 HS 3+/5* Q 3+/5* GS 5/5 TA 5/5 EHL 5/5  *exam limited by patient effort due to back pain  SILT L2-S1  WWP BLE               79 F s/p L2-L5 Lami/PSF POD 3    HD stable, improving   Analgesia- On PO   DVT ppx-  qd, Eliquis 2.5mg BID starting POD 5  HMV, 15/15, Will Possibly DC today 12/20  Taper  Incentive spirometry  WBAT in LSO brace  P/T, OOBTC   DC planning  Will d/w attending and advise if plan changes. Orthopaedic Surgery Progress Note    Subjective:   Patient seen and examined at bedside this am. Pain is well controlled. Pt reports of some tingling in BL toes, no other complaints at this time. Pt is overall improving.         Vital Signs Last 24 Hrs  T(C): 36.5 (20 Dec 2019 05:10), Max: 37.3 (19 Dec 2019 11:00)  T(F): 97.7 (20 Dec 2019 05:10), Max: 99.1 (19 Dec 2019 11:00)  HR: 79 (20 Dec 2019 05:10) (72 - 93)  BP: 158/89 (20 Dec 2019 05:10) (114/70 - 158/89)  BP(mean): 98 (19 Dec 2019 13:00) (94 - 111)  RR: 18 (20 Dec 2019 05:10) (18 - 47)  SpO2: 94% (20 Dec 2019 05:10) (92% - 98%)    PE  NAD  Back:   dressing C/D/I, mild appropriate natalie-incisional ttp  HMV in place w/ serosanguinous output  Neuro:  RLE IP 4/5 HS 3+/5* Q 3+/5* GS 5/5 TA 5/5 EHL 5/5   SILT L2-S1  LLE IP 4/5 HS 3+/5* Q 3+/5* GS 5/5 TA 5/5 EHL 5/5  *exam limited by patient effort due to back pain  SILT L2-S1  WWP BLE               79 F s/p L2-L5 Lami/PSF POD 3      Will DC [facility] for TOV today 12/20  Will DC HMV Today 12/20  HD stable, improving   Analgesia- On PO   DVT ppx-  qd, Eliquis 2.5mg BID starting POD 5  Taper  Incentive spirometry  WBAT in LSO brace  P/T, OOBTC   DC planning  Will d/w attending and advise if plan changes.

## 2019-12-20 NOTE — DISCHARGE NOTE PROVIDER - NSDCFUADDINST_GEN_ALL_CORE_FT
Continue weight bearing as tolerated ambulation with rolling walker. Keep surgical incision/dressing clean and dry, remove dressing post operative day #14 (12/31/19). Follow up with Dr Rob in his office 3-4 weeks post op.

## 2019-12-20 NOTE — DISCHARGE NOTE PROVIDER - HOSPITAL COURSE
Reason for Admission    " I'm having back surgery         History of Present Illness:    History of Present Illness        Patient is a 79 year old female with past medical history of HTN, HLD, malignant melanoma of skin (right calf) 1985, with excision of left shoulder in 2017,  and former smoker,  lung CA with Right lobe resection x 10 years. Reports worsening left sided back pain that radiates down leg. Scheduled L2- L5 lumbar laminectomy with posterior spinal instrumentation and fusion with Dr. Rob on  12/17/19.    Of note- patient recently had upper back skin biospy performed by Dermatologist due h/o melanoma         PAST MEDICAL HISTORY:    C. difficile diarrhea     Diverticulosis of large intestine with hemorrhage 04/2017    Ex-smoker     HTN (hypertension)     Hx of melanoma of skin- leg  >7 years ago     Hyperlipidemia     Lung nodules- H/O     Melanoma in situ of left shoulder     SCC (squamous cell carcinoma) anterior upper chest wall.         PAST SURGICAL HISTORY:    H/O colonoscopy 06/08/2017    H/O melanoma excision ?    H/O: hysterectomy- 1982     Hx of tonsillectomy in childhood    Squamous cell skin cancer excision and biopsy > 1 year    Status post lung surgery > 7 years ago  lung ca.        HOSPITAL COURSE;    80 y/o FM underwent L2-5 Laminectomy, posterior spinal fusion on 12/17/19 with .  Patient tolerated procedure well.  Patient was evaluated postoperatively by physical and occupational therapists for weight bearing as tolerated and advised that patient would benefit from admission to rehab facility.  Patient advised to keep surgical incision/dressing clean and dry, and have dressing  removed  post op day #14.  Patient further advised to follow up with Dr. Rob in his office 3-4 weeks post op. Reason for Admission    " I'm having back surgery         History of Present Illness:    History of Present Illness        Patient is a 79 year old female with past medical history of HTN, HLD, malignant melanoma of skin (right calf) 1985, with excision of left shoulder in 2017,  and former smoker,  lung CA with Right lobe resection x 10 years. Reports worsening left sided back pain that radiates down leg. Scheduled L2- L5 lumbar laminectomy with posterior spinal instrumentation and fusion with Dr. Rob on  12/17/19.    Of note- patient recently had upper back skin biospy performed by Dermatologist due h/o melanoma         PAST MEDICAL HISTORY:    C. difficile diarrhea     Diverticulosis of large intestine with hemorrhage 04/2017    Ex-smoker     HTN (hypertension)     Hx of melanoma of skin- leg  >7 years ago     Hyperlipidemia     Lung nodules- H/O     Melanoma in situ of left shoulder     SCC (squamous cell carcinoma) anterior upper chest wall.         PAST SURGICAL HISTORY:    H/O colonoscopy 06/08/2017    H/O melanoma excision ?    H/O: hysterectomy- 1982     Hx of tonsillectomy in childhood    Squamous cell skin cancer excision and biopsy > 1 year    Status post lung surgery > 7 years ago  lung ca.        HOSPITAL COURSE;    80 y/o FM underwent L2-5 Laminectomy, posterior spinal fusion on 12/17/19 with .  Patient tolerated procedure well.  Patient was evaluated postoperatively by physical and occupational therapists for weight bearing as tolerated and advised that patient would benefit from admission to rehab facility.  Patient went to Rehab with [facility] secondary to postop urinary retention.  Patient advised to keep surgical incision/dressing clean and dry, and have dressing  removed  post op day #14.  Patient further advised to follow up with Dr. Rob in his office 3-4 weeks post op.

## 2019-12-20 NOTE — CHART NOTE - NSCHARTNOTEFT_GEN_A_CORE
Patient visited for drain pull. Sutures d/c'd.  Hemostasis achieved, patient tolerated well, tip intact. 4x4 Dsg/tegaderm and tape placed.    Linda Vicente PA-C  Team Pager: #[phone]

## 2019-12-20 NOTE — PROVIDER CONTACT NOTE (OTHER) - ASSESSMENT
pt a&ox4. pt neurologically intact. no obvious signs of distress. pt frequently voiding small outs. pt complains of tenderness to abdomen when palpated. pt abdomen distended. pt bladder scanned which showed over 700 cc of urine. pt states she is uncomfortable

## 2019-12-20 NOTE — OCCUPATIONAL THERAPY INITIAL EVALUATION ADULT - DIAGNOSIS, OT EVAL
Patient presents with decreased balance, strength, endurance, and  bed mobility impacting ability to perform ADLs and functional mobility

## 2019-12-20 NOTE — DISCHARGE NOTE PROVIDER - NSDCMRMEDTOKEN_GEN_ALL_CORE_FT
aspirin 325 mg oral tablet: 1 milligram(s) orally 2 times a day  Aspirin Low Dose 81 mg oral delayed release tablet: 1 tab(s) orally once a day  atorvastatin 40 mg oral tablet: 1 tab(s) orally once a day  lisinopril 20 mg oral tablet: orally 2 times a day acetaminophen 325 mg oral tablet: 3 tab(s) orally every 8 hours  apixaban 2.5 mg oral tablet: 1 tab(s) orally 2 times a day  ascorbic acid 500 mg oral tablet: 1 tab(s) orally 2 times a day  aspirin 325 mg oral delayed release tablet: 1 tab(s) orally once a day  atorvastatin 40 mg oral tablet: 1 tab(s) orally once a day  calcium-vitamin D 500 mg-200 intl units (5 mcg) oral tablet: 1 tab(s) orally 3 times a day  cyclobenzaprine 10 mg oral tablet: 1 tab(s) orally every 8 hours  folic acid 1 mg oral tablet: 1 tab(s) orally once a day  lisinopril 20 mg oral tablet: orally 2 times a day  Multiple Vitamins oral tablet: 1 tab(s) orally once a day  oxyCODONE 10 mg oral tablet: 1 tab(s) orally every 6 hours, As needed, Severe Pain (7 - 10)  oxyCODONE 5 mg oral tablet: 1 tab(s) orally every 4 hours, As needed, Moderate Pain (4 - 6)  polyethylene glycol 3350 oral powder for reconstitution: 17 gram(s) orally once a day  senna oral tablet: 2 tab(s) orally once a day (at bedtime)

## 2019-12-20 NOTE — OCCUPATIONAL THERAPY INITIAL EVALUATION ADULT - BALANCE TRAINING, PT EVAL
GOAL: Patient will increase static/dynamicstanding balance by 1/2 grade to facilitate increased ability to perform ADLs and functional mobility

## 2019-12-20 NOTE — DISCHARGE NOTE PROVIDER - NSDCACTIVITY_GEN_ALL_CORE
Walking - Outdoors allowed/No heavy lifting/straining/Walking - Indoors allowed/Do not drive or operate machinery/Do not make important decisions/Stairs allowed

## 2019-12-20 NOTE — OCCUPATIONAL THERAPY INITIAL EVALUATION ADULT - ADDITIONAL COMMENTS
Pt lives alone in a private home with 1 zaid and full flight to second floor, +shower stall in bathroom. No dme in place PTA pt was working and independent in all adls

## 2019-12-21 RX ORDER — APIXABAN 2.5 MG/1
2.5 TABLET, FILM COATED ORAL
Refills: 0 | Status: DISCONTINUED | OUTPATIENT
Start: 2019-12-22 | End: 2019-12-23

## 2019-12-21 RX ADMIN — Medication 1 TABLET(S): at 13:00

## 2019-12-21 RX ADMIN — CYCLOBENZAPRINE HYDROCHLORIDE 10 MILLIGRAM(S): 10 TABLET, FILM COATED ORAL at 23:17

## 2019-12-21 RX ADMIN — Medication 500 MILLIGRAM(S): at 17:01

## 2019-12-21 RX ADMIN — Medication 1 MILLIGRAM(S): at 13:00

## 2019-12-21 RX ADMIN — Medication 975 MILLIGRAM(S): at 17:01

## 2019-12-21 RX ADMIN — Medication 975 MILLIGRAM(S): at 17:31

## 2019-12-21 RX ADMIN — Medication 975 MILLIGRAM(S): at 11:17

## 2019-12-21 RX ADMIN — CYCLOBENZAPRINE HYDROCHLORIDE 10 MILLIGRAM(S): 10 TABLET, FILM COATED ORAL at 06:59

## 2019-12-21 RX ADMIN — Medication 1 TABLET(S): at 23:17

## 2019-12-21 RX ADMIN — Medication 500 MILLIGRAM(S): at 06:59

## 2019-12-21 RX ADMIN — OXYCODONE HYDROCHLORIDE 10 MILLIGRAM(S): 5 TABLET ORAL at 00:52

## 2019-12-21 RX ADMIN — Medication 975 MILLIGRAM(S): at 10:47

## 2019-12-21 RX ADMIN — Medication 1 MILLIGRAM(S): at 06:59

## 2019-12-21 RX ADMIN — OXYCODONE HYDROCHLORIDE 10 MILLIGRAM(S): 5 TABLET ORAL at 23:17

## 2019-12-21 RX ADMIN — Medication 975 MILLIGRAM(S): at 01:00

## 2019-12-21 RX ADMIN — Medication 1 TABLET(S): at 06:59

## 2019-12-21 RX ADMIN — OXYCODONE HYDROCHLORIDE 10 MILLIGRAM(S): 5 TABLET ORAL at 17:01

## 2019-12-21 RX ADMIN — Medication 975 MILLIGRAM(S): at 01:30

## 2019-12-21 RX ADMIN — OXYCODONE HYDROCHLORIDE 10 MILLIGRAM(S): 5 TABLET ORAL at 17:31

## 2019-12-21 RX ADMIN — Medication 325 MILLIGRAM(S): at 13:00

## 2019-12-21 RX ADMIN — ATORVASTATIN CALCIUM 40 MILLIGRAM(S): 80 TABLET, FILM COATED ORAL at 23:17

## 2019-12-21 RX ADMIN — CYCLOBENZAPRINE HYDROCHLORIDE 10 MILLIGRAM(S): 10 TABLET, FILM COATED ORAL at 13:00

## 2019-12-21 RX ADMIN — OXYCODONE HYDROCHLORIDE 10 MILLIGRAM(S): 5 TABLET ORAL at 23:47

## 2019-12-21 RX ADMIN — Medication 1 MILLIGRAM(S): at 00:22

## 2019-12-21 RX ADMIN — OXYCODONE HYDROCHLORIDE 10 MILLIGRAM(S): 5 TABLET ORAL at 00:22

## 2019-12-21 NOTE — PROGRESS NOTE ADULT - PROBLEM SELECTOR PLAN 1
-    Pain control  -    DVT ppx: SCDs       -    Physical Therapy  -    Weight bearing status: WBAT [x]        PWB    [ ]     TTWB  [ ]      NWB  [ ] ; encourage ambulation  -    Dispo: Home [ ]     Rehab [ ]      [facility] [x]      To be determined [ ]      Linda Vicente PA-C  Team Pager #[phone]

## 2019-12-21 NOTE — PROGRESS NOTE ADULT - ASSESSMENT
A/P: Patient is a 79y Female s/p L2-L5 laminectomy / PSF. A/P: Patient is a 79y Female s/p L2-L5 laminectomy / PSF.    Attending:  patient seen and examined    agree with housestaff assessment    Plan as per above    Darron BOWMAN

## 2019-12-21 NOTE — PROGRESS NOTE ADULT - SUBJECTIVE AND OBJECTIVE BOX
POST OPERATIVE DAY #:  4     Patient Resting without complaints /events overnight.    Exam:   Alert/Oriented, No Acute Distress  ABD- + BS, Soft,  ND NT         Dressing: small area of serousanguinous drainage noted to dressing.         Motor exam: [  ]          [ ] Upper extremity                Bi          Tri        Delt                                                    R         5/5        5/5        5/5                                               L          5/5        5/5        5/5                  [ ] Lower extremity                      PF          DF         EHL       FHL                                                                                            R        4+/5        4+/5      5/5       5/5                                                        L         4+/5        4+/5      5/5       5/5                   Sensation: [ ] intact to light touch  [x] decreased to left shin                                              Calves Soft/Non-tender bilaterally           Toes warm well perfused; capillary refill <3 seconds              LABS:                        10.0   8.92  )-----------( 156      ( 20 Dec 2019 08:49 )             31.5     12-20    143  |  104  |  19  ----------------------------<  127<H>  4.2   |  29  |  0.66    Ca    9.8      20 Dec 2019 07:03          RADIOLOGY & ADDITIONAL STUDIES:

## 2019-12-22 RX ADMIN — Medication 500 MILLIGRAM(S): at 06:28

## 2019-12-22 RX ADMIN — OXYCODONE HYDROCHLORIDE 10 MILLIGRAM(S): 5 TABLET ORAL at 11:55

## 2019-12-22 RX ADMIN — CYCLOBENZAPRINE HYDROCHLORIDE 10 MILLIGRAM(S): 10 TABLET, FILM COATED ORAL at 13:09

## 2019-12-22 RX ADMIN — Medication 500 MILLIGRAM(S): at 17:52

## 2019-12-22 RX ADMIN — Medication 1 TABLET(S): at 13:09

## 2019-12-22 RX ADMIN — Medication 975 MILLIGRAM(S): at 18:24

## 2019-12-22 RX ADMIN — Medication 1 MILLIGRAM(S): at 11:02

## 2019-12-22 RX ADMIN — ATORVASTATIN CALCIUM 40 MILLIGRAM(S): 80 TABLET, FILM COATED ORAL at 21:57

## 2019-12-22 RX ADMIN — Medication 1 TABLET(S): at 21:57

## 2019-12-22 RX ADMIN — Medication 975 MILLIGRAM(S): at 11:30

## 2019-12-22 RX ADMIN — Medication 1 TABLET(S): at 11:02

## 2019-12-22 RX ADMIN — OXYCODONE HYDROCHLORIDE 10 MILLIGRAM(S): 5 TABLET ORAL at 11:05

## 2019-12-22 RX ADMIN — Medication 975 MILLIGRAM(S): at 10:59

## 2019-12-22 RX ADMIN — Medication 975 MILLIGRAM(S): at 03:00

## 2019-12-22 RX ADMIN — Medication 975 MILLIGRAM(S): at 03:30

## 2019-12-22 RX ADMIN — Medication 975 MILLIGRAM(S): at 17:54

## 2019-12-22 RX ADMIN — Medication 1 TABLET(S): at 06:28

## 2019-12-22 RX ADMIN — SENNA PLUS 2 TABLET(S): 8.6 TABLET ORAL at 21:57

## 2019-12-22 RX ADMIN — APIXABAN 2.5 MILLIGRAM(S): 2.5 TABLET, FILM COATED ORAL at 17:52

## 2019-12-22 RX ADMIN — OXYCODONE HYDROCHLORIDE 10 MILLIGRAM(S): 5 TABLET ORAL at 17:53

## 2019-12-22 RX ADMIN — CYCLOBENZAPRINE HYDROCHLORIDE 10 MILLIGRAM(S): 10 TABLET, FILM COATED ORAL at 06:28

## 2019-12-22 RX ADMIN — APIXABAN 2.5 MILLIGRAM(S): 2.5 TABLET, FILM COATED ORAL at 06:49

## 2019-12-22 RX ADMIN — CYCLOBENZAPRINE HYDROCHLORIDE 10 MILLIGRAM(S): 10 TABLET, FILM COATED ORAL at 21:57

## 2019-12-22 RX ADMIN — OXYCODONE HYDROCHLORIDE 10 MILLIGRAM(S): 5 TABLET ORAL at 18:22

## 2019-12-22 NOTE — PROGRESS NOTE ADULT - PROBLEM SELECTOR PLAN 1
A/P: Patient is 79y Female s/p L2-L5 laminectomy/PSF.  -    Pain control  -    DVT ppx: To start Eliquis 2.5 mg BID today  -    Physical Therapy  -    Weight bearing status: WBAT [x]        PWB    [ ]     TTWB  [ ]      NWB  [ ]  -    Dispo: Home [ ]     Rehab [ ]      [facility] [x]      To be determined [ ]      Linda Vicente PA-C  Team Pager #[phone]

## 2019-12-22 NOTE — PROGRESS NOTE ADULT - SUBJECTIVE AND OBJECTIVE BOX
POST OPERATIVE DAY #:  5     Patient Resting without complaints /events overnight. Patient needed to be straight catheterized once overnight for retention.     Exam:   Alert/Oriented, No Acute Distress  ABD- + BS, Soft,  ND NT         Dressing: small area of serousanguinous fluid noted to dressing          Motor exam: [  ]          [ ] Upper extremity                Bi          Tri        Delt                                                    R         5/5        5/5        5/5                                               L          5/5        5/5        5/5                  [ ] Lower extremity                     PF          DF         EHL       FHL                                                                                            R        4+/5        4+/5        5/5       5/5                                                        L         4+/5        4+/5        5/5       5/5                   Sensation: [ ] intact to light touch  [x] decreased to left shin                                             Calves Soft/Non-tender bilaterally         Toes warm well perfused; capillary refill <3 seconds              LABS:                        10.0   8.92  )-----------( 156      ( 20 Dec 2019 08:49 )             31.5               RADIOLOGY & ADDITIONAL STUDIES:

## 2019-12-22 NOTE — PROGRESS NOTE ADULT - ASSESSMENT
A/P: Patient is 79y Female s/p L2-L5 laminectomy/PSF.  -    Pain control  -    DVT ppx: To start Eliquis 2.5 mg BID today  -    Physical Therapy  -    Weight bearing status: WBAT [x]        PWB    [ ]     TTWB  [ ]      NWB  [ ]  -    Dispo: Home [ ]     Rehab [ ]      KENNY [x]      To be determined [ ]      Linda Vicente PA-C  Team Pager #[phone] A/P: Patient is 79y Female s/p L2-L5 laminectomy/PSF.  -    Pain control  -    DVT ppx: To start Eliquis 2.5 mg BID today  -    Physical Therapy  -    Weight bearing status: WBAT [x]        PWB    [ ]     TTWB  [ ]      NWB  [ ]  -    Dispo: Home [ ]     Rehab [ ]      KENNY [x]      To be determined [ ]      Linda Vicente PA-C  Team Pager #[phone]      Attending:  patient seen and examined    agree with housestaff assessment    Plan as per above    Darron BOWMAN

## 2019-12-23 ENCOUNTER — INBOUND DOCUMENT (OUTPATIENT)
Age: 79
End: 2019-12-23

## 2019-12-23 ENCOUNTER — TRANSCRIPTION ENCOUNTER (OUTPATIENT)
Age: 79
End: 2019-12-23

## 2019-12-23 VITALS
OXYGEN SATURATION: 96 % | RESPIRATION RATE: 16 BRPM | SYSTOLIC BLOOD PRESSURE: 96 MMHG | TEMPERATURE: 98 F | HEART RATE: 92 BPM | DIASTOLIC BLOOD PRESSURE: 64 MMHG

## 2019-12-23 LAB — SURGICAL PATHOLOGY STUDY: SIGNIFICANT CHANGE UP

## 2019-12-23 PROCEDURE — 86850 RBC ANTIBODY SCREEN: CPT

## 2019-12-23 PROCEDURE — 84295 ASSAY OF SERUM SODIUM: CPT

## 2019-12-23 PROCEDURE — 82565 ASSAY OF CREATININE: CPT

## 2019-12-23 PROCEDURE — 82330 ASSAY OF CALCIUM: CPT

## 2019-12-23 PROCEDURE — 85027 COMPLETE CBC AUTOMATED: CPT

## 2019-12-23 PROCEDURE — 82435 ASSAY OF BLOOD CHLORIDE: CPT

## 2019-12-23 PROCEDURE — 86901 BLOOD TYPING SEROLOGIC RH(D): CPT

## 2019-12-23 PROCEDURE — C1889: CPT

## 2019-12-23 PROCEDURE — 83735 ASSAY OF MAGNESIUM: CPT

## 2019-12-23 PROCEDURE — 97530 THERAPEUTIC ACTIVITIES: CPT

## 2019-12-23 PROCEDURE — 88311 DECALCIFY TISSUE: CPT

## 2019-12-23 PROCEDURE — 72100 X-RAY EXAM L-S SPINE 2/3 VWS: CPT

## 2019-12-23 PROCEDURE — 80048 BASIC METABOLIC PNL TOTAL CA: CPT

## 2019-12-23 PROCEDURE — 36430 TRANSFUSION BLD/BLD COMPNT: CPT

## 2019-12-23 PROCEDURE — P9045: CPT

## 2019-12-23 PROCEDURE — C1713: CPT

## 2019-12-23 PROCEDURE — 82947 ASSAY GLUCOSE BLOOD QUANT: CPT

## 2019-12-23 PROCEDURE — 84100 ASSAY OF PHOSPHORUS: CPT

## 2019-12-23 PROCEDURE — 71045 X-RAY EXAM CHEST 1 VIEW: CPT

## 2019-12-23 PROCEDURE — P9016: CPT

## 2019-12-23 PROCEDURE — 84132 ASSAY OF SERUM POTASSIUM: CPT

## 2019-12-23 PROCEDURE — 97161 PT EVAL LOW COMPLEX 20 MIN: CPT

## 2019-12-23 PROCEDURE — 97110 THERAPEUTIC EXERCISES: CPT

## 2019-12-23 PROCEDURE — 86900 BLOOD TYPING SEROLOGIC ABO: CPT

## 2019-12-23 PROCEDURE — 88304 TISSUE EXAM BY PATHOLOGIST: CPT

## 2019-12-23 PROCEDURE — 97166 OT EVAL MOD COMPLEX 45 MIN: CPT

## 2019-12-23 PROCEDURE — 97116 GAIT TRAINING THERAPY: CPT

## 2019-12-23 PROCEDURE — 86923 COMPATIBILITY TEST ELECTRIC: CPT

## 2019-12-23 PROCEDURE — 83605 ASSAY OF LACTIC ACID: CPT

## 2019-12-23 PROCEDURE — 85014 HEMATOCRIT: CPT

## 2019-12-23 PROCEDURE — 82803 BLOOD GASES ANY COMBINATION: CPT

## 2019-12-23 RX ORDER — ASPIRIN/CALCIUM CARB/MAGNESIUM 324 MG
1 TABLET ORAL
Qty: 0 | Refills: 0 | DISCHARGE
Start: 2019-12-23

## 2019-12-23 RX ORDER — ASPIRIN/CALCIUM CARB/MAGNESIUM 324 MG
1 TABLET ORAL
Qty: 0 | Refills: 0 | DISCHARGE

## 2019-12-23 RX ORDER — ASCORBIC ACID 60 MG
1 TABLET,CHEWABLE ORAL
Qty: 0 | Refills: 0 | DISCHARGE
Start: 2019-12-23

## 2019-12-23 RX ORDER — SENNA PLUS 8.6 MG/1
2 TABLET ORAL
Qty: 0 | Refills: 0 | DISCHARGE
Start: 2019-12-23

## 2019-12-23 RX ORDER — ACETAMINOPHEN 500 MG
3 TABLET ORAL
Qty: 0 | Refills: 0 | DISCHARGE
Start: 2019-12-23

## 2019-12-23 RX ORDER — OXYCODONE HYDROCHLORIDE 5 MG/1
1 TABLET ORAL
Qty: 0 | Refills: 0 | DISCHARGE
Start: 2019-12-23

## 2019-12-23 RX ORDER — ASPIRIN/CALCIUM CARB/MAGNESIUM 324 MG
325 TABLET ORAL DAILY
Refills: 0 | Status: DISCONTINUED | OUTPATIENT
Start: 2019-12-23 | End: 2019-12-23

## 2019-12-23 RX ORDER — CYCLOBENZAPRINE HYDROCHLORIDE 10 MG/1
1 TABLET, FILM COATED ORAL
Qty: 0 | Refills: 0 | DISCHARGE
Start: 2019-12-23

## 2019-12-23 RX ORDER — POLYETHYLENE GLYCOL 3350 17 G/17G
17 POWDER, FOR SOLUTION ORAL
Qty: 0 | Refills: 0 | DISCHARGE
Start: 2019-12-23

## 2019-12-23 RX ORDER — APIXABAN 2.5 MG/1
1 TABLET, FILM COATED ORAL
Qty: 0 | Refills: 0 | DISCHARGE
Start: 2019-12-23

## 2019-12-23 RX ORDER — FOLIC ACID 0.8 MG
1 TABLET ORAL
Qty: 0 | Refills: 0 | DISCHARGE
Start: 2019-12-23

## 2019-12-23 RX ADMIN — Medication 1 MILLIGRAM(S): at 11:20

## 2019-12-23 RX ADMIN — Medication 1 TABLET(S): at 13:32

## 2019-12-23 RX ADMIN — Medication 975 MILLIGRAM(S): at 09:27

## 2019-12-23 RX ADMIN — Medication 975 MILLIGRAM(S): at 09:57

## 2019-12-23 RX ADMIN — CYCLOBENZAPRINE HYDROCHLORIDE 10 MILLIGRAM(S): 10 TABLET, FILM COATED ORAL at 06:05

## 2019-12-23 RX ADMIN — OXYCODONE HYDROCHLORIDE 10 MILLIGRAM(S): 5 TABLET ORAL at 06:28

## 2019-12-23 RX ADMIN — CYCLOBENZAPRINE HYDROCHLORIDE 10 MILLIGRAM(S): 10 TABLET, FILM COATED ORAL at 13:32

## 2019-12-23 RX ADMIN — OXYCODONE HYDROCHLORIDE 10 MILLIGRAM(S): 5 TABLET ORAL at 06:55

## 2019-12-23 RX ADMIN — Medication 1 TABLET(S): at 06:05

## 2019-12-23 RX ADMIN — Medication 1 TABLET(S): at 11:19

## 2019-12-23 RX ADMIN — Medication 325 MILLIGRAM(S): at 11:19

## 2019-12-23 RX ADMIN — Medication 500 MILLIGRAM(S): at 06:05

## 2019-12-23 RX ADMIN — APIXABAN 2.5 MILLIGRAM(S): 2.5 TABLET, FILM COATED ORAL at 06:06

## 2019-12-23 RX ADMIN — POLYETHYLENE GLYCOL 3350 17 GRAM(S): 17 POWDER, FOR SOLUTION ORAL at 11:20

## 2019-12-23 NOTE — PROGRESS NOTE ADULT - SUBJECTIVE AND OBJECTIVE BOX
Orthopaedic Surgery Progress Note    Subjective:   Patient seen and examined  No acute events overnight    Objective:  T(C): 37.1 (12-23-19 @ 04:50), Max: 37.1 (12-23-19 @ 04:50)  HR: 86 (12-23-19 @ 04:50) (83 - 89)  BP: 113/76 (12-23-19 @ 04:50) (94/62 - 113/76)  RR: 16 (12-23-19 @ 04:50) (16 - 17)  SpO2: 95% (12-23-19 @ 04:50) (93% - 95%)    12-21 @ 07:01  -  12-22 @ 07:00  --------------------------------------------------------  IN: 920 mL / OUT: 2400 mL / NET: -1480 mL    12-22 @ 07:01  -  12-23 @ 06:27  --------------------------------------------------------  IN: 960 mL / OUT: 3050 mL / NET: -2090 mL    PE  NAD  Back:   dressing changed this AM, now C/D/I, mild appropriate natalie-incisional ttp  Neuro:  RLE IP 5/5 HS 5/5 Q 5/5 GS 5/5 TA 5/5 EHL 5/5   SILT L2-S1  LLE IP 5/5 HS 5/5 Q 5/5 GS 5/5 TA 5/5 EHL 4+/5  SILT L2-S1, but subjectively decreased sensation below the knee in no specific distribution  WWP BLE    79y Female  POD6 s/p L2-5 lami/PSF    - Pain control  - FU labs  - WBAT  - PT/OT/OOB  - I/S  - SCDs, Eliquis BID  - hilary Bolden to DC to rehab w/ gwendolyn  - Dispo planning    Sharif Nuno MD

## 2019-12-23 NOTE — DISCHARGE NOTE NURSING/CASE MANAGEMENT/SOCIAL WORK - PATIENT PORTAL LINK FT
You can access the FollowMyHealth Patient Portal offered by [facility] by registering at the following website: [email or web address]. By joining [facility]’s FollowMyHealth portal, you will also be able to view your health information using other applications (apps) compatible with our system.

## 2020-01-27 ENCOUNTER — APPOINTMENT (OUTPATIENT)
Dept: ORTHOPEDIC SURGERY | Facility: CLINIC | Age: 80
End: 2020-01-27
Payer: MEDICARE

## 2020-01-27 VITALS — HEIGHT: 64 IN | WEIGHT: 185 LBS | BODY MASS INDEX: 31.58 KG/M2

## 2020-01-27 PROCEDURE — 72100 X-RAY EXAM L-S SPINE 2/3 VWS: CPT

## 2020-01-27 PROCEDURE — 99024 POSTOP FOLLOW-UP VISIT: CPT

## 2020-01-27 RX ORDER — ACETAMINOPHEN 500 MG/1
TABLET, COATED ORAL
Refills: 0 | Status: ACTIVE | COMMUNITY

## 2020-02-13 ENCOUNTER — FORM ENCOUNTER (OUTPATIENT)
Age: 80
End: 2020-02-13

## 2020-02-14 ENCOUNTER — OUTPATIENT (OUTPATIENT)
Dept: OUTPATIENT SERVICES | Facility: HOSPITAL | Age: 80
LOS: 1 days | End: 2020-02-14
Payer: MEDICARE

## 2020-02-14 ENCOUNTER — APPOINTMENT (OUTPATIENT)
Dept: RADIOLOGY | Facility: CLINIC | Age: 80
End: 2020-02-14
Payer: MEDICARE

## 2020-02-14 ENCOUNTER — APPOINTMENT (OUTPATIENT)
Dept: ORTHOPEDIC SURGERY | Facility: CLINIC | Age: 80
End: 2020-02-14
Payer: MEDICARE

## 2020-02-14 VITALS — WEIGHT: 185 LBS | BODY MASS INDEX: 31.58 KG/M2 | HEIGHT: 64 IN

## 2020-02-14 DIAGNOSIS — Z00.8 ENCOUNTER FOR OTHER GENERAL EXAMINATION: ICD-10-CM

## 2020-02-14 DIAGNOSIS — Z98.890 OTHER SPECIFIED POSTPROCEDURAL STATES: Chronic | ICD-10-CM

## 2020-02-14 DIAGNOSIS — M48.061 SPINAL STENOSIS, LUMBAR REGION WITHOUT NEUROGENIC CLAUDICATION: ICD-10-CM

## 2020-02-14 DIAGNOSIS — M70.61 TROCHANTERIC BURSITIS, RIGHT HIP: ICD-10-CM

## 2020-02-14 PROCEDURE — 99024 POSTOP FOLLOW-UP VISIT: CPT

## 2020-02-14 PROCEDURE — 20610 DRAIN/INJ JOINT/BURSA W/O US: CPT | Mod: RT

## 2020-02-14 PROCEDURE — 77002 NEEDLE LOCALIZATION BY XRAY: CPT | Mod: 26

## 2020-02-14 PROCEDURE — 77002 NEEDLE LOCALIZATION BY XRAY: CPT

## 2020-02-14 PROCEDURE — 20610 DRAIN/INJ JOINT/BURSA W/O US: CPT

## 2020-02-14 PROCEDURE — 72110 X-RAY EXAM L-2 SPINE 4/>VWS: CPT

## 2020-02-15 PROBLEM — M48.061 LUMBAR CANAL STENOSIS: Status: ACTIVE | Noted: 2019-11-25

## 2020-02-15 PROBLEM — M70.61 GREATER TROCHANTERIC BURSITIS OF RIGHT HIP: Status: ACTIVE | Noted: 2020-02-15

## 2020-02-15 NOTE — HISTORY OF PRESENT ILLNESS
[de-identified] : Patient is now 8-1/2 weeks status post multilevel lumbar spinal fusion for spondylolisthesis and spinal stenosis.  She is actually doing fairly well with significant improvement in her left leg pain that was preoperatively quite severe.  She is complaining of right lateral thigh pain over the lateral greater trochanteric area down onto the iliotibial band.  There is minimal back pain.  She has been wearing her brace religiously.

## 2020-02-15 NOTE — PHYSICAL EXAM
[de-identified] : Wound nicely healed. Neurologically intact. The patient is able to stand on toes and heels (with the assistance of balance) without difficulty and can do a single-leg deep knee bend bilaterally without weakness or giving way. Hip and Knee ROM without pain. SLR restricted at 80 degrees bilaterally by lower back pain.  She does have exquisite tenderness over the greater trochanter and iliotibial band and no tenderness in the sciatic notch or the lower spine. [de-identified] : AP, flexion extension lateral, and standing lateral x-ray of the lumbar spine show no evidence of abnormal motion in the area of the fusion with good placement of hardware and no loosening.  There is no adjacent segment change at this time.

## 2020-02-15 NOTE — REASON FOR VISIT
[Post Operative Visit] : a post operative visit for [Radiculopathy] : radiculopathy [Spinal Stenosis] : spinal stenosis [Spondylolistheses] : spondylolistheses [FreeTextEntry2] : She is 2 months S/P lumbar fusion. She is using a walker to ambulate and wearing a TLSO brace. Her only complaint is pain in her right hip and numbness on her left lower leg. Pain today is 8/10. Tylenol does not help.

## 2020-02-15 NOTE — DISCUSSION/SUMMARY
[de-identified] : From a spinal point surgical point of view she is doing quite well.  She does have right greater trochanteric bursitis and iliotibial band tendinitis as well as some gluteus medius tendinitis.  I sent her to the radiologist for radiologic guided right greater trochanteric injection.  We will see how she does with this.  She can wean herself from the TLSO brace at this time.  Follow-up in 4 weeks.

## 2020-02-26 ENCOUNTER — FORM ENCOUNTER (OUTPATIENT)
Age: 80
End: 2020-02-26

## 2020-02-27 ENCOUNTER — APPOINTMENT (OUTPATIENT)
Dept: RADIOLOGY | Facility: CLINIC | Age: 80
End: 2020-02-27
Payer: MEDICARE

## 2020-02-27 ENCOUNTER — OUTPATIENT (OUTPATIENT)
Dept: OUTPATIENT SERVICES | Facility: HOSPITAL | Age: 80
LOS: 1 days | End: 2020-02-27
Payer: MEDICARE

## 2020-02-27 DIAGNOSIS — Z00.8 ENCOUNTER FOR OTHER GENERAL EXAMINATION: ICD-10-CM

## 2020-02-27 DIAGNOSIS — Z98.890 OTHER SPECIFIED POSTPROCEDURAL STATES: Chronic | ICD-10-CM

## 2020-02-27 PROCEDURE — 77002 NEEDLE LOCALIZATION BY XRAY: CPT

## 2020-02-27 PROCEDURE — 77002 NEEDLE LOCALIZATION BY XRAY: CPT | Mod: 26

## 2020-02-27 PROCEDURE — 20610 DRAIN/INJ JOINT/BURSA W/O US: CPT

## 2020-02-27 PROCEDURE — 20610 DRAIN/INJ JOINT/BURSA W/O US: CPT | Mod: RT

## 2020-04-20 ENCOUNTER — APPOINTMENT (OUTPATIENT)
Dept: ORTHOPEDIC SURGERY | Facility: CLINIC | Age: 80
End: 2020-04-20

## 2020-06-08 ENCOUNTER — APPOINTMENT (OUTPATIENT)
Dept: ORTHOPEDIC SURGERY | Facility: CLINIC | Age: 80
End: 2020-06-08
Payer: MEDICARE

## 2020-06-08 VITALS — HEIGHT: 64 IN | WEIGHT: 185 LBS | BODY MASS INDEX: 31.58 KG/M2

## 2020-06-08 DIAGNOSIS — M54.16 RADICULOPATHY, LUMBAR REGION: ICD-10-CM

## 2020-06-08 PROCEDURE — 99213 OFFICE O/P EST LOW 20 MIN: CPT

## 2020-06-08 PROCEDURE — 72110 X-RAY EXAM L-2 SPINE 4/>VWS: CPT

## 2020-06-08 RX ORDER — PREDNISONE 10 MG/1
10 TABLET ORAL
Qty: 39 | Refills: 0 | Status: DISCONTINUED | COMMUNITY
Start: 2020-04-16 | End: 2020-06-08

## 2020-08-05 ENCOUNTER — APPOINTMENT (OUTPATIENT)
Dept: THORACIC SURGERY | Facility: CLINIC | Age: 80
End: 2020-08-05
Payer: MEDICARE

## 2020-08-05 VITALS
HEIGHT: 64 IN | TEMPERATURE: 98 F | SYSTOLIC BLOOD PRESSURE: 124 MMHG | DIASTOLIC BLOOD PRESSURE: 84 MMHG | WEIGHT: 180 LBS | OXYGEN SATURATION: 95 % | BODY MASS INDEX: 30.73 KG/M2 | RESPIRATION RATE: 16 BRPM | HEART RATE: 82 BPM

## 2020-08-05 DIAGNOSIS — R91.1 SOLITARY PULMONARY NODULE: ICD-10-CM

## 2020-08-05 PROCEDURE — 99213 OFFICE O/P EST LOW 20 MIN: CPT

## 2020-08-06 NOTE — CONSULT LETTER
[Dear  ___] : Dear  [unfilled], [Courtesy Letter:] : I had the pleasure of seeing your patient, [unfilled], in my office today. [Please see my note below.] : Please see my note below. [Sincerely,] : Sincerely, [FreeTextEntry2] : Dr. Carlos Hannah  [FreeTextEntry3] : Wilmar Rios MD, FACS \par , Division of Thoracic Surgery \par [facility] \par Chief, Thoracic Surgery \par [facility] \par Department of Cardiovascular & Thoracic Surgery \par  \par [employer] [facility] \par \par

## 2020-08-06 NOTE — PHYSICAL EXAM
[Sclera] : the sclera and conjunctiva were normal [] : the neck was supple [Neck Cervical Mass (___cm)] : no neck mass was observed [Neck Appearance] : the appearance of the neck was normal [Auscultation Breath Sounds / Voice Sounds] : lungs were clear to auscultation bilaterally [Respiration, Rhythm And Depth] : normal respiratory rhythm and effort [Heart Rate And Rhythm] : heart rate was normal and rhythm regular [Heart Sounds] : normal S1 and S2 [Examination Of The Chest] : the chest was normal in appearance [Abdomen Tenderness] : non-tender [Abdomen Soft] : soft [Cervical Lymph Nodes Enlarged Posterior Bilaterally] : posterior cervical [Cervical Lymph Nodes Enlarged Anterior Bilaterally] : anterior cervical [No CVA Tenderness] : no ~M costovertebral angle tenderness [Supraclavicular Lymph Nodes Enlarged Bilaterally] : supraclavicular [No Focal Deficits] : no focal deficits [Skin Color & Pigmentation] : normal skin color and pigmentation [Oriented To Time, Place, And Person] : oriented to person, place, and time [Impaired Insight] : insight and judgment were intact [Affect] : the affect was normal [FreeTextEntry1] : ambulates with cane

## 2020-08-06 NOTE — HISTORY OF PRESENT ILLNESS
[FreeTextEntry1] : 80 year old female with history of a right VATS, right lower lobe wedge resection on 2/2/09 for a T1NxMx adenocarcinoma with bronchioalveolar features. 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 Dias.  She underwent spinal fusion surgery in December 2019 with Dr. Rob and continues to complain of occasional spasm in the left side. \par \par CT chest scan on 2/15/19 reveals scattered nodular opacities that are stable. There is a 30 x 23 mm nodule of left adrenal that has increased in both size and density since previous exam. \par \par CT Chest on 7/29/20:\par - stable moderate emphysema\par - a patchy low density nodular opacity with central lucency adjacent to fissure in superior LLL, 9 mm (previously 8 mm)\par - a few tiny nodules up to 3 mm Rt apex\par - resolved reticular and patchy opacity RML and RLL\par - increased but small cluster of centrilobular nodularity in NETTIE\par - increased or minimally changed Lt adrenal nodule 3cm\par \par Pt presents today for follow up.  She admits to shortness of breath with exertion and occasional cough.  She denies any fever, chills, chest pain, hemoptysis, or recent illness. \par \par

## 2020-08-06 NOTE — REVIEW OF SYSTEMS
[SOB on Exertion] : shortness of breath during exertion [Negative] : Psychiatric [FreeTextEntry9] : ambulates with cane

## 2020-08-06 NOTE — DATA REVIEWED
[FreeTextEntry1] : CT Chest on 7/29/20:\par - stable moderate emphysema\par - a patchy low density nodular opacity with central lucency adjacent to fissure in superior LLL, 9 mm (previously 8 mm)\par - a few tiny nodules up to 3 mm Rt apex\par - resolved reticular and patchy opacity RML and RLL\par - increased but small cluster of centrilobular nodularity in NETTIE\par - increased or minimally changed Lt adrenal nodule 3cm

## 2020-08-06 NOTE — ASSESSMENT
[FreeTextEntry1] : 80 year old female, former smoker with history of a right VATS, right lower lobe wedge resection on 2/2/09 for a T1NxMx adenocarcinoma with JUAN M features. \par \par CT Chest on 7/29/20:\par - stable moderate emphysema\par - patchy low density nodular opacity with central lucency adjacent to fissure in superior LLL, 9 mm (previously 8 mm)\par - a few tiny nodules up to 3 mm Rt apex\par - resolved reticular and patchy opacity RML and RLL\par - increased but small cluster of centrilobular nodularity in NETTIE\par - increased or minimally changed Lt adrenal nodule 3cm\par \par I have reviewed the patient's medical records and diagnostic images during the time of this office visit, and I have made the following recommendation: RAYMUNDO.  Follow up in 18 months with noncontrast Chest CT.\par \par I personally performed the services described in the documentation, reviewed the documentation recorded by the scribe in my presence and it accurately and completely records my words and actions.\par \par I, Mahnaz Reinoso NP, am scribing for and the presence of Dr. Rios, the following sections HISTORY OF PRESENT ILLNESS, PAST MEDICAL/FAMILY/SOCIAL HISTORY; REVIEW OF SYSTEMS; VITAL SIGNS; PHYSICAL EXAM; DISPOSITION.\par

## 2020-12-14 ENCOUNTER — APPOINTMENT (OUTPATIENT)
Dept: ORTHOPEDIC SURGERY | Facility: CLINIC | Age: 80
End: 2020-12-14
Payer: MEDICARE

## 2020-12-14 VITALS — TEMPERATURE: 96.1 F

## 2020-12-14 VITALS — BODY MASS INDEX: 35.34 KG/M2 | WEIGHT: 180 LBS | HEIGHT: 60 IN

## 2020-12-14 DIAGNOSIS — M43.16 SPONDYLOLISTHESIS, LUMBAR REGION: ICD-10-CM

## 2020-12-14 PROCEDURE — 99213 OFFICE O/P EST LOW 20 MIN: CPT

## 2020-12-14 PROCEDURE — 72110 X-RAY EXAM L-2 SPINE 4/>VWS: CPT

## 2020-12-14 RX ORDER — HYDROCHLOROTHIAZIDE 12.5 MG/1
12.5 TABLET ORAL
Qty: 90 | Refills: 0 | Status: DISCONTINUED | COMMUNITY
Start: 2020-06-16

## 2021-02-11 NOTE — H&P PST ADULT - LIVES WITH, PROFILE
Spirometry was acceptable and reproducible by ATS standards    Spirometry  1. Spirometry is normal.    Bronchodilator  1. There is no response to bronchodilator demonstrated. [Increase in FEV1 and/or FVC => 12% of control and => 200 ml]    Lung Volumes  2. Lung volumes are at the lower limit of normal.    Diffusing Capacity  3. Diffusing capacity is normal.        Overall Interpretation  PFT does not demonstrates obstruction with FEV1 of 91 %  FVC of 86%  without bronchodilator response. Normal lung volume without air trapping or hyperinflation Diffusion capacity is normal  This is consistent with normal PFT.       Pulmonary Function Testing Results:    FEV1 %Pred-Pre   Date Value Ref Range Status   02/11/2021 91 % Final     FEV1 %Pred-Post   Date Value Ref Range Status   02/11/2021 92 % Final     FEV1/FVC-Pre   Date Value Ref Range Status   02/11/2021 83 % Final     FEV1/FVC-Post   Date Value Ref Range Status   02/11/2021 85 % Final     TLC %Pred   Date Value Ref Range Status   02/11/2021 78 % Final     DLCO %Pred   Date Value Ref Range Status   02/11/2021 86 % Final             OBSTRUCTION % Predicted FEV1   MILD >70%   MODERATE 60-69%   MODERATELY-SEVERE 50-59%   SEVERE 35-49%   VERY SEVERE <35%         RESTRICTION % Predicted TLC   MILD Less than LLN but > than 70%   MODERATE 60-69%   MODERATELY-SEVERE <60%                 DIFFUSION CAPACITY DL,CO % Pred   MILD >60% AND < LLN   MODERATE 40-60%   SEVERE <40%       PFT data will be scanned into the procedure tab in epic under this encounter    Candi Zazueta MD  [facility] Pulmonology
alone

## 2021-06-07 RX ORDER — IBUPROFEN 800 MG/1
800 TABLET, FILM COATED ORAL 3 TIMES DAILY
Qty: 90 | Refills: 0 | Status: ACTIVE | COMMUNITY
Start: 2020-03-20 | End: 1900-01-01

## 2021-06-08 NOTE — OCCUPATIONAL THERAPY INITIAL EVALUATION ADULT - ASSISTIVE DEVICE, REHAB EVAL
Your current Orthopaedic problem we are working together to treat is:  Left knee osteoarthritis.      CARE  · We recommend a surgical consultation with Dr. Amadou Herrera or Dr. Dequan Herrera at [phone]  · Home program..  · Tylenol as needed.  · Voltaren gel if helpful.      It is recommended you schedule a follow-up appointment with Luis E Mitchell MD as needed.      Office hours are 8:00 am to 5:00 pm Monday through Friday. If it is urgent that you speak with someone outside of these hours, our [facility] Call Center will be able to assist you. You can reach the office by calling the [facility] [place] at [phone], [place] at [phone], or  [place] at [phone].     We do highly recommend [facility], if you do not already have this. You can request access via the internet or by simply talking with a  at any of the clinics.   [email or web address].      Thank you for choosing [facility] as your Orthopedic provider!      
bed rails

## 2021-06-18 NOTE — DISCHARGE NOTE PROVIDER - NSDCQMAMI_CARD_ALL_CORE
On the Day of Your Surgery June 30,2021 Please Arrive At 09:45 AM     Enter the hospital through the Main Entrance, take the lobby elevators to the second floor and check in at the Surgery Registration desk. Continue to take your home medications as you normally do up to and including the night before surgery with the exception of blood thinning medications. Blood Thinning Medications:  Please stop prescription blood thinning medications such as Apixaban (Eliquis); Clopidogrel (Plavix); Dabigatran (Pradaxa); Prasugrel (Effient); Rivaroxaban (Xarelto); Ticagrelor (Brilinta); Warfarin (Coumadin) only as directed by your surgeon and/or the prescribing physician    Some common examples of other medications that can thin your blood are: Aspirin, Ibuprofen (Advil, Motrin), Naproxen (Aleve), Meloxicam (Mobic), Celecoxib (Celebrex), Fish Oil, many Herbal Supplements. These medications should usually be stopped at least 7 days prior to surgery. Check with MD when to top aspirin before surgery  Stop ibuprofen 7 days before surgery      Tylenol is OK to take for pain the week prior to surgery. Failure to stop certain medications may interfere with your scheduled surgery. If you receive instructions from your surgeon regarding what medications to stop prior to surgery, please follow those specific instructions. Please take the following medication(s) the day of surgery with small sips of water:                Showering Before Surgery: You can play an important role in your own health by carefully washing before surgery    If you were given Chlorhexidine soap, please follow the instructions included with the soap to shower the night before and the morning of surgery. If you were not given Chlorhexidine, please shower or bathe the morning of surgery using an antibacterial soap. Please dress in clean clothing after showering.     Do Not apply any powder, deodorant, lotion/cream/oil, perfume/aftershave, cosmetics, or alcohol-based skin or hair products after showering. Do Not shave near the planned surgical site unless specifically instructed to. Additional Instructions:      1. If you are having any type of anesthesia, you are to have NOTHING to eat or drink after midnight the night before surgery. This includes no gum, hard candy, mints or water. The only exception to this is small sips of water to take the medications listed above. No smoking or chewing tobacco after midnight. No alcoholic beverages for 24 hours prior to surgery. 2. Brush your teeth but do not swallow any water. 3. If you wear glasses bring a case for them if you have one. No contacts should be worn the day of surgery. You may also bring your hearing aids. If you have dentures, most surgical procedures involving anesthesia will require that you remove them prior to surgery. 5. Do not wear any jewelry or body piercings the day of surgery. No nail polish on the operative extremity (arm/hand or leg/foot surgeries). 7. Please wear loose, comfortable clothing. If you are potentially going to have a cast, sling, brace or bulky dressing, make sure to wear clothing that will fit over it. 8. In case of illness - If you have cold or flu like symptoms (high fever, runny nose, sore throat, cough, etc.) rash, nausea, vomiting, loose stools, and/or recent contact with someone who has a contagious disease (chicken pox, measles, COVID-19, etc.). Please call your surgeon before coming to the hospital.    Transportation After Your Surgery/Procedure: If you are going home the same day of surgery you need someone to drive you home. Your  must be at least 25years of age. A taxi cab or other nonmedical public transportation is not acceptable unless you have someone to ride home in the vehicle with you.      For your safety, someone must remain with you for the first 24 hours after your surgery if you receive anesthesia or medication. If you do not have someone to stay with you, your procedure may be cancelled. As a patient at [facility] you can expect quality medical and nursing care that is centered on you individual needs. Our goal is to make your surgical experience as comfortable as possible.     Any questions about preparing for your surgery please call [phone].      ____________________________   ____________________________  Signature (Patient)                                 Signature (Nurse)                     Date No

## 2021-07-15 ENCOUNTER — RX RENEWAL (OUTPATIENT)
Age: 81
End: 2021-07-15

## 2022-02-09 ENCOUNTER — APPOINTMENT (OUTPATIENT)
Dept: THORACIC SURGERY | Facility: CLINIC | Age: 82
End: 2022-02-09
Payer: MEDICARE

## 2022-02-09 PROCEDURE — 99213 OFFICE O/P EST LOW 20 MIN: CPT | Mod: 95

## 2022-02-09 PROCEDURE — 99203 OFFICE O/P NEW LOW 30 MIN: CPT | Mod: 95

## 2022-02-09 NOTE — CONSULT LETTER
[Dear  ___] : Dear  [unfilled], [Courtesy Letter:] : I had the pleasure of seeing your patient, [unfilled], in my office today. [Please see my note below.] : Please see my note below. [Sincerely,] : Sincerely, [FreeTextEntry2] : Dr. Carlos Hannah  [FreeTextEntry3] : Wilmar Rios MD, FACS \par , Division of Thoracic Surgery \par [facility] \par Chief, Thoracic Surgery \par [facility] \par Department of Cardiovascular & Thoracic Surgery \par  \par [facility] [employer] [facility] \par \par

## 2022-02-09 NOTE — ASSESSMENT
[FreeTextEntry1] : 81 year old female, former smoker with history of a right VATS, right lower lobe wedge resection on 2/2/09 for a T1NxMx adenocarcinoma with JUAN M features. \par \par Latest scan stable in the thorax. Plan follow up scan 18 months. \par \par \par \par \par

## 2022-02-09 NOTE — HISTORY OF PRESENT ILLNESS
[Home] : at home, [unfilled] , at the time of the visit. [Medical Office: ([facility])___] : at the medical office located in  [Verbal consent obtained from patient] : the patient, [unfilled] [FreeTextEntry1] : 81 year old female with history of a right VATS, right lower lobe wedge resection on 2/2/09 for a T1NxMx adenocarcinoma with bronchioalveolar features. 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 Dias.  She underwent spinal fusion surgery in December 2019 with Dr. Rob and continues to complain of occasional spasm in the left side. \par \par CT Chest on 7/29/20:\par - stable moderate emphysema\par - a patchy low density nodular opacity with central lucency adjacent to fissure in superior LLL, 9 mm (previously 8 mm)\par - a few tiny nodules up to 3 mm Rt apex\par - resolved reticular and patchy opacity RML and RLL\par - increased but small cluster of centrilobular nodularity in NETTIE\par - increased or minimally changed Lt adrenal nodule 3cm\par \par CT Chest on 1/29/22:\par - 2 x 3 mm ill defined nodular focus in periphery of NETTIE (image 11)\par - Enlargement of left thyroid lobe\par - Small hiatal hernia\par - Left adrenal lesion 2.8 x 2 cm\par - Some bilateral renal stones\par \par Patient is followed today via Telehealth visit. Patient denies worsening shortness of breath, cough, chest pain, fever, chills, unintentional weight loss, hemoptysis. \par

## 2022-03-09 NOTE — PROGRESS NOTE ADULT - PROVIDER SPECIALTY LIST ADULT
Karmen Sanches  [id number]  1964    Chief Complaint   Patient presents with   • New Patient         Current Outpatient Medications   Medication Sig Dispense Refill   • clopidogrel (PLAVIX) 75 MG tablet TAKE 1 TABLET BY MOUTH DAILY 30 tablet 2   • spironolactone (ALDACTONE) 25 MG tablet Take 0.5 tablets by mouth daily. 45 tablet 1   • Farxiga 10 MG tablet TAKE 1 TABLET BY MOUTH DAILY 30 tablet 3   • sacubitril-valsartan (Entresto) 49-51 MG per tablet Take 1 tablet by mouth 2 times daily. 60 tablet 1   • rosuvastatin (CRESTOR) 20 MG tablet Take 0.5 tablets by mouth daily. 90 tablet 3   • Synthroid 100 MCG tablet Take 100 mcg by mouth daily.     • aspirin (Aspirin 81) 81 MG EC tablet Take 1 tablet by mouth daily. 90 tablet 3   • metoPROLOL succinate (TOPROL-XL) 25 MG 24 hr tablet Take 0.5 tablets by mouth daily. 90 tablet 3     No current facility-administered medications for this visit.       Visit Vitals  BP 91/59 (BP Location: LUE - Left upper extremity, Patient Position: Sitting, Cuff Size: Regular)   Pulse 78   Ht 5' 7\" (1.702 m)   Wt 87 kg (191 lb 11 oz)   SpO2 99%   BMI 30.02 kg/m²       HPI:    57 year old female with past medical history significant for , ischemic cardiomyopathy, LVEF of 27% (cardiac MRI  2020- [facility]- , now 40% on echocardiogram 2022.)  CAD status post PCI LAD in 2020), [employer] ICD implanted 2020 for primary prevention, s/p LV apical thrombus which had resolved, papillary thyroid cancer status post thyroid resection when she was in twenties     She is here to establish care since her insurance change from [employer] to [employer]    She has no complaints, denies RODRIGUEZ, orthopnea , exertional chest pain.  She has never had an ICD shock     Her father  from a heart attack , not  , no children       Review of Systems   Constitutional: Negative for decreased appetite, fever, weight gain and weight loss.   Eyes: Negative for blurred vision  SICU and double vision.   Cardiovascular: Negative for chest pain, dyspnea on exertion, irregular heartbeat, leg swelling, near-syncope, orthopnea, palpitations, paroxysmal nocturnal dyspnea and syncope.   Respiratory: Negative for cough and shortness of breath.    Skin: Negative for rash and suspicious lesions.   Musculoskeletal: Negative for muscle weakness.   Gastrointestinal: Negative for abdominal pain, diarrhea, dysphagia, nausea and vomiting.   Genitourinary: Negative for dysuria.   Neurological: Negative for dizziness and light-headedness.       PAST MEDICAL HISTORY:  Past Medical History:   Diagnosis Date   • Coronary artery disease involving native coronary artery of native heart without angina pectoris    • Coronary artery disease involving native coronary artery of native heart without angina pectoris 2021   • Hypothyroidism 2021   • ICD (implantable cardioverter-defibrillator) in place 2021   • Ischemic cardiomyopathy    • LV (left ventricular) mural thrombus 2020   • Mixed hyperlipidemia 2021   • Old MI (myocardial infarction)    • Papillary thyroid carcinoma (CMS/HCC)     30 years ago   • Status post coronary artery stent placement 2021       PAST SURGICAL HISTORY:  Past Surgical History:   Procedure Laterality Date   • Coronary angioplasty with stent placement  2020    mid LAD x2   • Icd implant  2020   • Thyroidectomy         SOCIAL HISTORY:   Social History     Tobacco Use   • Smoking status: Former Smoker     Packs/day: 0.50     Years: 10.00     Pack years: 5.00     Types: Cigarettes     Start date:      Quit date: 1995     Years since quittin.2   • Smokeless tobacco: Never Used   Substance Use Topics   • Alcohol use: Not Currently     Alcohol/week: 2.0 standard drinks     Types: 2 Glasses of wine per week     Comment: socially.        Drug Use:    Yes                Special: Marijuana       Comment: gummies, for back pain      FAMILY HISTORY:    Family History   Problem Relation Age of Onset   • Diabetes Mother    • Heart disease Father 70   • Patient is unaware of any medical problems Sister         Physical Exam   Constitutional: She appears healthy. No distress.   Cardiovascular: Normal rate, regular rhythm, S1 normal, S2 normal and normal heart sounds.   Pulmonary/Chest: Effort normal and breath sounds normal. She has no wheezes. She has no rales.   Left sided ICD site well healed    Abdominal: Soft. She exhibits no distension and no mass. There is no splenomegaly or hepatomegaly. There is no abdominal tenderness.   Musculoskeletal:         General: No tenderness or edema.      Cervical back: Normal range of motion.   Neurological: She is alert.   Skin: Skin is warm.         Transthoracic Echocardiogram (TTE)     Patient: Karmen Sanches    Study Date/Time:       Feb 10 2022 8:32AM  MRN:     [id number]          FIN#:                  [id number]  :     1964        Ht/Wt:                 170cm 87.5kg  Age:     57                BSA/BMI:               1.99m^2 30.3kg/m^2  Gender:  F                 Baseline BP:           89 / 56  Ordering Physician:  Isabella Donato MD     Referring Physician: Isabella Donato MD P, Tanmay Afoma Isabella Donato MD     Attending Physician: Isabella Donato MD  Performing           [facility]  Physician:  Diagnostic           Isabella Donato MD  Physician:  Sonographer:         Mara Villavicencio     --------------------------------------------------------------------------  INDICATIONS:  Heart failure.     --------------------------------------------------------------------------  STUDY CONCLUSIONS  SUMMARY:     1. Left ventricle: The cavity size is mildly dilated. Wall thickness is     normal. Systolic function is reduced. The ejection fraction was     measured by biplane method of disks. Doppler parameters are consistent     with abnormal left ventricular relaxation (grade 1 diastolic      dysfunction). The ejection fraction is 40%.  2. Regional wall motion abnormalities: Akinesis of the mid anteroseptal,     apical septal, apical lateral, and apical myocardium; hypokinesis of     the mid-apical anterior and mid inferoseptal myocardium.  3. Right ventricle: Pacemaker lead noted in right ventricle.  4. Left atrium: The atrium is moderately dilated.  5. Aortic valve: The annulus is mildly to moderately calcified. Unable to     determine morphology. The leaflets are moderately calcified. Mild     regurgitation.  6. Inferior vena cava: The vessel is normal in size. The respirophasic     diameter changes are in the normal range (greater than or equal to     50%).    Assessment   57 year old woman with a history of ischemic cardiomyopathy in whom an ICD was implanted for primary prevention.    She is doing well, execises without exertional chest pain, denies orthopnea     1. Ischemic cardiomyopathy    2. ICD (implantable cardioverter-defibrillator) in place      1 Ischemic cardiomyopathy    Stable without complaints of chest pain or decompensated CHF, Her EKG shows sinus rhythm with first degree AV block and probable anteroseptal infarction of indeterminate age     Plan: continue meds, sees Nicole Caro and Tanmay    2. ICD in place    Implanted for primary prevention, implant site well healed , interrogation today shows normal function     Plan: Establish appointments for serial surveillance     RTC 6 months

## 2023-07-26 ENCOUNTER — APPOINTMENT (OUTPATIENT)
Dept: THORACIC SURGERY | Facility: CLINIC | Age: 83
End: 2023-07-26
Payer: MEDICARE

## 2023-08-02 ENCOUNTER — APPOINTMENT (OUTPATIENT)
Dept: THORACIC SURGERY | Facility: CLINIC | Age: 83
End: 2023-08-02
Payer: MEDICARE

## 2023-08-02 DIAGNOSIS — C34.90 MALIGNANT NEOPLASM OF UNSPECIFIED PART OF UNSPECIFIED BRONCHUS OR LUNG: ICD-10-CM

## 2023-08-02 PROCEDURE — 99442: CPT | Mod: 95

## 2023-08-02 NOTE — REASON FOR VISIT
[Self] : self [This encounter was initiated by telehealth (audio with video) and converted to telephone (audio only) due to technical difficulties.] : This encounter was initiated by telehealth (audio with video) and converted to telephone (audio only) due to technical difficulties.

## 2023-08-02 NOTE — HISTORY OF PRESENT ILLNESS
[FreeTextEntry1] : 83 year old female with history of a right VATS, right lower lobe wedge resection on 2/2/09 for a T1NxMx adenocarcinoma with bronchioalveolar features.   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 Dias.  She underwent spinal fusion surgery in December 2019 with Dr. Rob and continues to complain of occasional spasm in the left side.   CT Chest on 7/29/20: - stable moderate emphysema - a patchy low density nodular opacity with central lucency adjacent to fissure in superior LLL, 9 mm (previously 8 mm) - a few tiny nodules up to 3 mm Rt apex - resolved reticular and patchy opacity RML and RLL - increased but small cluster of centrilobular nodularity in NETTIE - increased or minimally changed Lt adrenal nodule 3cm  CT Chest on 1/29/22: - 2 x 3 mm ill defined nodular focus in periphery of NETTIE (image 11) - Enlargement of left thyroid lobe - Small hiatal hernia - Left adrenal lesion 2.8 x 2 cm - Some bilateral renal stones  CT Chest on 07/10/2023 ([facility]): - Postop changes - Mild emphysematous change in both lungs. - Stable left adrenal mass favoring benign etiology. - Stable asymmetric left lobe thyroid enlargement.  Patient is followed today via Telehealth visit for 18 months follow up. Denies SOB, CP, cough.

## 2023-08-02 NOTE — ASSESSMENT
[FreeTextEntry1] : 83 year old female, former smoker with history of a right VATS, right lower lobe wedge resection on 2/2/09 for a T1NxMx adenocarcinoma with JUAN M features.   Presents today via tele visit for follow up with imaging.  I have independently reviewed the medical records and imaging at the time of this office consultation. CT scan showed no evidence of recurrence, recommended patient to return to office in 18 months  w/ CT Chest w/o contrast. Recommendations reviewed with patient during this office visit, and all questions answered; Patient instructed on the importance of follow up and verbalizes understanding.     I, ANIBAL Ortiz, personally performed the evaluation and management (E/M) services for this established patient who presents today with (a) new problem(s)/exacerbation of (an) existing condition(s). That E/M includes conducting the examination, assessing all new/exacerbated conditions, and establishing a new plan of care. Today, my ACP, Krista Castillo NP was here to observe my evaluation and management services for this new problem/exacerbated condition to be followed going forward.

## 2023-08-02 NOTE — CONSULT LETTER
[FreeTextEntry2] : Dr. Carlos Hannah  [FreeTextEntry3] : Wilmar Rios MD, FACS \par , Division of Thoracic Surgery \par [facility] \par Chief, Thoracic Surgery \par [facility] \par Department of Cardiovascular & Thoracic Surgery \par  \par [facility] [employer] [facility] \par \par

## 2024-02-08 NOTE — H&P PST ADULT - LAST ECHOCARDIOGRAM
Mucinex and ATB sent  
The patient called and stated that she is coughing up a lot of green mucus and her chest burns when she coughs and wants to know if there is anything that she can take for it. Please advise.   
06/2017

## 2025-06-18 ENCOUNTER — APPOINTMENT (OUTPATIENT)
Dept: THORACIC SURGERY | Facility: CLINIC | Age: 85
End: 2025-06-18
Payer: MEDICARE

## 2025-06-18 PROCEDURE — 99203 OFFICE O/P NEW LOW 30 MIN: CPT | Mod: 93
